# Patient Record
Sex: MALE | Race: WHITE | NOT HISPANIC OR LATINO | Employment: FULL TIME | URBAN - METROPOLITAN AREA
[De-identification: names, ages, dates, MRNs, and addresses within clinical notes are randomized per-mention and may not be internally consistent; named-entity substitution may affect disease eponyms.]

---

## 2022-01-15 ENCOUNTER — APPOINTMENT (OUTPATIENT)
Dept: GENERAL RADIOLOGY | Facility: HOSPITAL | Age: 32
End: 2022-01-15

## 2022-01-15 ENCOUNTER — ANESTHESIA (OUTPATIENT)
Dept: EMERGENCY DEPT | Facility: HOSPITAL | Age: 32
End: 2022-01-15

## 2022-01-15 ENCOUNTER — ANESTHESIA (OUTPATIENT)
Dept: PERIOP | Facility: HOSPITAL | Age: 32
End: 2022-01-15

## 2022-01-15 ENCOUNTER — HOSPITAL ENCOUNTER (INPATIENT)
Facility: HOSPITAL | Age: 32
LOS: 4 days | Discharge: HOME | End: 2022-01-19
Attending: EMERGENCY MEDICINE | Admitting: ORTHOPAEDIC SURGERY

## 2022-01-15 ENCOUNTER — ANESTHESIA EVENT (OUTPATIENT)
Dept: PERIOP | Facility: HOSPITAL | Age: 32
End: 2022-01-15

## 2022-01-15 ENCOUNTER — ANESTHESIA EVENT (OUTPATIENT)
Dept: EMERGENCY DEPT | Facility: HOSPITAL | Age: 32
End: 2022-01-15

## 2022-01-15 DIAGNOSIS — D72.829 LEUKOCYTOSIS, UNSPECIFIED TYPE: ICD-10-CM

## 2022-01-15 DIAGNOSIS — U07.1 COVID-19: ICD-10-CM

## 2022-01-15 DIAGNOSIS — T79.A22A TRAUMATIC COMPARTMENT SYNDROME OF LEFT LOWER EXTREMITY, INITIAL ENCOUNTER: Primary | ICD-10-CM

## 2022-01-15 DIAGNOSIS — R74.8 ELEVATED CK: ICD-10-CM

## 2022-01-15 LAB
ABO GROUP BLD: NORMAL
ALBUMIN SERPL-MCNC: 5.2 G/DL (ref 3.5–5.2)
ALBUMIN/GLOB SERPL: 2 G/DL
ALP SERPL-CCNC: 63 U/L (ref 39–117)
ALT SERPL W P-5'-P-CCNC: 21 U/L (ref 1–41)
ANION GAP SERPL CALCULATED.3IONS-SCNC: 10 MMOL/L (ref 5–15)
AST SERPL-CCNC: 20 U/L (ref 1–40)
BASOPHILS # BLD AUTO: 0.05 10*3/MM3 (ref 0–0.2)
BASOPHILS NFR BLD AUTO: 0.4 % (ref 0–1.5)
BILIRUB SERPL-MCNC: 0.6 MG/DL (ref 0–1.2)
BLD GP AB SCN SERPL QL: NEGATIVE
BUN SERPL-MCNC: 14 MG/DL (ref 6–20)
BUN/CREAT SERPL: 14.3 (ref 7–25)
CALCIUM SPEC-SCNC: 9.8 MG/DL (ref 8.6–10.5)
CHLORIDE SERPL-SCNC: 103 MMOL/L (ref 98–107)
CK SERPL-CCNC: 292 U/L (ref 20–200)
CO2 SERPL-SCNC: 26 MMOL/L (ref 22–29)
CREAT SERPL-MCNC: 0.98 MG/DL (ref 0.76–1.27)
D-LACTATE SERPL-SCNC: 1.7 MMOL/L (ref 0.5–2)
DEPRECATED RDW RBC AUTO: 45.3 FL (ref 37–54)
EOSINOPHIL # BLD AUTO: 0.04 10*3/MM3 (ref 0–0.4)
EOSINOPHIL NFR BLD AUTO: 0.3 % (ref 0.3–6.2)
ERYTHROCYTE [DISTWIDTH] IN BLOOD BY AUTOMATED COUNT: 14.2 % (ref 12.3–15.4)
FLUAV RNA RESP QL NAA+PROBE: NOT DETECTED
FLUAV SUBTYP SPEC NAA+PROBE: NOT DETECTED
FLUBV RNA ISLT QL NAA+PROBE: NOT DETECTED
FLUBV RNA RESP QL NAA+PROBE: NOT DETECTED
GFR SERPL CREATININE-BSD FRML MDRD: 90 ML/MIN/1.73
GLOBULIN UR ELPH-MCNC: 2.6 GM/DL
GLUCOSE SERPL-MCNC: 103 MG/DL (ref 65–99)
HCT VFR BLD AUTO: 45.6 % (ref 37.5–51)
HGB BLD-MCNC: 15 G/DL (ref 13–17.7)
IMM GRANULOCYTES # BLD AUTO: 0.05 10*3/MM3 (ref 0–0.05)
IMM GRANULOCYTES NFR BLD AUTO: 0.4 % (ref 0–0.5)
LYMPHOCYTES # BLD AUTO: 1.39 10*3/MM3 (ref 0.7–3.1)
LYMPHOCYTES NFR BLD AUTO: 10.8 % (ref 19.6–45.3)
MCH RBC QN AUTO: 28.4 PG (ref 26.6–33)
MCHC RBC AUTO-ENTMCNC: 32.9 G/DL (ref 31.5–35.7)
MCV RBC AUTO: 86.4 FL (ref 79–97)
MONOCYTES # BLD AUTO: 0.71 10*3/MM3 (ref 0.1–0.9)
MONOCYTES NFR BLD AUTO: 5.5 % (ref 5–12)
MYOGLOBIN SERPL-MCNC: 52.1 NG/ML (ref 28–72)
NEUTROPHILS NFR BLD AUTO: 10.62 10*3/MM3 (ref 1.7–7)
NEUTROPHILS NFR BLD AUTO: 82.6 % (ref 42.7–76)
NRBC BLD AUTO-RTO: 0 /100 WBC (ref 0–0.2)
PLATELET # BLD AUTO: 269 10*3/MM3 (ref 140–450)
PMV BLD AUTO: 10.7 FL (ref 6–12)
POTASSIUM SERPL-SCNC: 4.2 MMOL/L (ref 3.5–5.2)
PROT SERPL-MCNC: 7.8 G/DL (ref 6–8.5)
RBC # BLD AUTO: 5.28 10*6/MM3 (ref 4.14–5.8)
RH BLD: POSITIVE
RSV RNA NPH QL NAA+NON-PROBE: NOT DETECTED
SARS-COV-2 RNA PNL SPEC NAA+PROBE: DETECTED
SARS-COV-2 RNA RESP QL NAA+PROBE: DETECTED
SODIUM SERPL-SCNC: 139 MMOL/L (ref 136–145)
T&S EXPIRATION DATE: NORMAL
WBC NRBC COR # BLD: 12.86 10*3/MM3 (ref 3.4–10.8)

## 2022-01-15 PROCEDURE — 80053 COMPREHEN METABOLIC PANEL: CPT | Performed by: EMERGENCY MEDICINE

## 2022-01-15 PROCEDURE — 25010000002 ONDANSETRON PER 1 MG: Performed by: EMERGENCY MEDICINE

## 2022-01-15 PROCEDURE — 83874 ASSAY OF MYOGLOBIN: CPT | Performed by: INTERNAL MEDICINE

## 2022-01-15 PROCEDURE — 0KNT0ZZ RELEASE LEFT LOWER LEG MUSCLE, OPEN APPROACH: ICD-10-PCS | Performed by: ORTHOPAEDIC SURGERY

## 2022-01-15 PROCEDURE — 86901 BLOOD TYPING SEROLOGIC RH(D): CPT

## 2022-01-15 PROCEDURE — 87636 SARSCOV2 & INF A&B AMP PRB: CPT | Performed by: INTERNAL MEDICINE

## 2022-01-15 PROCEDURE — 0 CEFAZOLIN PER 500 MG: Performed by: ANESTHESIOLOGY

## 2022-01-15 PROCEDURE — 83605 ASSAY OF LACTIC ACID: CPT | Performed by: EMERGENCY MEDICINE

## 2022-01-15 PROCEDURE — 0 CEFAZOLIN IN DEXTROSE 2-4 GM/100ML-% SOLUTION: Performed by: ORTHOPAEDIC SURGERY

## 2022-01-15 PROCEDURE — 73610 X-RAY EXAM OF ANKLE: CPT

## 2022-01-15 PROCEDURE — 73590 X-RAY EXAM OF LOWER LEG: CPT

## 2022-01-15 PROCEDURE — 99284 EMERGENCY DEPT VISIT MOD MDM: CPT

## 2022-01-15 PROCEDURE — 99222 1ST HOSP IP/OBS MODERATE 55: CPT | Performed by: INTERNAL MEDICINE

## 2022-01-15 PROCEDURE — 87637 SARSCOV2&INF A&B&RSV AMP PRB: CPT | Performed by: EMERGENCY MEDICINE

## 2022-01-15 PROCEDURE — 86850 RBC ANTIBODY SCREEN: CPT | Performed by: ORTHOPAEDIC SURGERY

## 2022-01-15 PROCEDURE — 82550 ASSAY OF CK (CPK): CPT | Performed by: EMERGENCY MEDICINE

## 2022-01-15 PROCEDURE — 0 MEPERIDINE PER 100 MG: Performed by: ANESTHESIOLOGY

## 2022-01-15 PROCEDURE — 99284 EMERGENCY DEPT VISIT MOD MDM: CPT | Performed by: ORTHOPAEDIC SURGERY

## 2022-01-15 PROCEDURE — 86900 BLOOD TYPING SEROLOGIC ABO: CPT

## 2022-01-15 PROCEDURE — 25010000002 MIDAZOLAM PER 1 MG: Performed by: ANESTHESIOLOGY

## 2022-01-15 PROCEDURE — 90471 IMMUNIZATION ADMIN: CPT | Performed by: EMERGENCY MEDICINE

## 2022-01-15 PROCEDURE — 85025 COMPLETE CBC W/AUTO DIFF WBC: CPT | Performed by: EMERGENCY MEDICINE

## 2022-01-15 PROCEDURE — 25010000002 FENTANYL CITRATE (PF) 50 MCG/ML SOLUTION: Performed by: ANESTHESIOLOGY

## 2022-01-15 PROCEDURE — 25010000002 PROPOFOL 10 MG/ML EMULSION: Performed by: ANESTHESIOLOGY

## 2022-01-15 PROCEDURE — 73560 X-RAY EXAM OF KNEE 1 OR 2: CPT

## 2022-01-15 PROCEDURE — 25010000002 DEXAMETHASONE PER 1 MG: Performed by: ANESTHESIOLOGY

## 2022-01-15 PROCEDURE — 90715 TDAP VACCINE 7 YRS/> IM: CPT | Performed by: EMERGENCY MEDICINE

## 2022-01-15 PROCEDURE — 25010000002 HYDROMORPHONE PER 4 MG: Performed by: INTERNAL MEDICINE

## 2022-01-15 PROCEDURE — 27601 DECOMPRESSION OF LOWER LEG: CPT | Performed by: ORTHOPAEDIC SURGERY

## 2022-01-15 PROCEDURE — 25010000002 ENOXAPARIN PER 10 MG: Performed by: ORTHOPAEDIC SURGERY

## 2022-01-15 PROCEDURE — 87636 SARSCOV2 & INF A&B AMP PRB: CPT | Performed by: EMERGENCY MEDICINE

## 2022-01-15 PROCEDURE — 73630 X-RAY EXAM OF FOOT: CPT

## 2022-01-15 PROCEDURE — 86901 BLOOD TYPING SEROLOGIC RH(D): CPT | Performed by: ORTHOPAEDIC SURGERY

## 2022-01-15 PROCEDURE — 25010000002 TETANUS-DIPHTH-ACELL PERTUSSIS 5-2.5-18.5 LF-MCG/0.5 SUSPENSION PREFILLED SYRINGE: Performed by: EMERGENCY MEDICINE

## 2022-01-15 PROCEDURE — 86900 BLOOD TYPING SEROLOGIC ABO: CPT | Performed by: ORTHOPAEDIC SURGERY

## 2022-01-15 PROCEDURE — 25010000002 HYDROMORPHONE 1 MG/ML SOLUTION: Performed by: EMERGENCY MEDICINE

## 2022-01-15 RX ORDER — ACETAMINOPHEN 325 MG/1
650 TABLET ORAL EVERY 4 HOURS PRN
Status: DISCONTINUED | OUTPATIENT
Start: 2022-01-15 | End: 2022-01-19 | Stop reason: HOSPADM

## 2022-01-15 RX ORDER — FENTANYL CITRATE 50 UG/ML
50 INJECTION, SOLUTION INTRAMUSCULAR; INTRAVENOUS
Status: DISCONTINUED | OUTPATIENT
Start: 2022-01-15 | End: 2022-01-15 | Stop reason: HOSPADM

## 2022-01-15 RX ORDER — CEFAZOLIN SODIUM 1 G/3ML
INJECTION, POWDER, FOR SOLUTION INTRAMUSCULAR; INTRAVENOUS AS NEEDED
Status: DISCONTINUED | OUTPATIENT
Start: 2022-01-15 | End: 2022-01-15 | Stop reason: SURG

## 2022-01-15 RX ORDER — HYDROMORPHONE HYDROCHLORIDE 1 MG/ML
0.5 INJECTION, SOLUTION INTRAMUSCULAR; INTRAVENOUS; SUBCUTANEOUS
Status: DISCONTINUED | OUTPATIENT
Start: 2022-01-15 | End: 2022-01-19 | Stop reason: HOSPADM

## 2022-01-15 RX ORDER — FENTANYL CITRATE 50 UG/ML
INJECTION, SOLUTION INTRAMUSCULAR; INTRAVENOUS AS NEEDED
Status: DISCONTINUED | OUTPATIENT
Start: 2022-01-15 | End: 2022-01-15 | Stop reason: SURG

## 2022-01-15 RX ORDER — ONDANSETRON 2 MG/ML
4 INJECTION INTRAMUSCULAR; INTRAVENOUS EVERY 6 HOURS PRN
Status: DISCONTINUED | OUTPATIENT
Start: 2022-01-15 | End: 2022-01-19 | Stop reason: HOSPADM

## 2022-01-15 RX ORDER — ACETAMINOPHEN 160 MG/5ML
650 SOLUTION ORAL EVERY 4 HOURS PRN
Status: DISCONTINUED | OUTPATIENT
Start: 2022-01-15 | End: 2022-01-19 | Stop reason: HOSPADM

## 2022-01-15 RX ORDER — MAGNESIUM HYDROXIDE 1200 MG/15ML
LIQUID ORAL AS NEEDED
Status: DISCONTINUED | OUTPATIENT
Start: 2022-01-15 | End: 2022-01-15 | Stop reason: HOSPADM

## 2022-01-15 RX ORDER — PROPOFOL 10 MG/ML
VIAL (ML) INTRAVENOUS AS NEEDED
Status: DISCONTINUED | OUTPATIENT
Start: 2022-01-15 | End: 2022-01-15 | Stop reason: SURG

## 2022-01-15 RX ORDER — DEXAMETHASONE SODIUM PHOSPHATE 4 MG/ML
INJECTION, SOLUTION INTRA-ARTICULAR; INTRALESIONAL; INTRAMUSCULAR; INTRAVENOUS; SOFT TISSUE AS NEEDED
Status: DISCONTINUED | OUTPATIENT
Start: 2022-01-15 | End: 2022-01-15 | Stop reason: SURG

## 2022-01-15 RX ORDER — ACETAMINOPHEN 650 MG/1
650 SUPPOSITORY RECTAL EVERY 4 HOURS PRN
Status: DISCONTINUED | OUTPATIENT
Start: 2022-01-15 | End: 2022-01-19 | Stop reason: HOSPADM

## 2022-01-15 RX ORDER — LIDOCAINE HYDROCHLORIDE 10 MG/ML
INJECTION, SOLUTION EPIDURAL; INFILTRATION; INTRACAUDAL; PERINEURAL AS NEEDED
Status: DISCONTINUED | OUTPATIENT
Start: 2022-01-15 | End: 2022-01-15 | Stop reason: SURG

## 2022-01-15 RX ORDER — SODIUM CHLORIDE 0.9 % (FLUSH) 0.9 %
10 SYRINGE (ML) INJECTION AS NEEDED
Status: DISCONTINUED | OUTPATIENT
Start: 2022-01-15 | End: 2022-01-19 | Stop reason: HOSPADM

## 2022-01-15 RX ORDER — SODIUM CHLORIDE 0.9 % (FLUSH) 0.9 %
10 SYRINGE (ML) INJECTION EVERY 12 HOURS SCHEDULED
Status: DISCONTINUED | OUTPATIENT
Start: 2022-01-15 | End: 2022-01-19 | Stop reason: HOSPADM

## 2022-01-15 RX ORDER — NALOXONE HCL 0.4 MG/ML
0.4 VIAL (ML) INJECTION
Status: DISCONTINUED | OUTPATIENT
Start: 2022-01-15 | End: 2022-01-19 | Stop reason: HOSPADM

## 2022-01-15 RX ORDER — HYDROCODONE BITARTRATE AND ACETAMINOPHEN 5; 325 MG/1; MG/1
1 TABLET ORAL EVERY 4 HOURS PRN
Status: DISCONTINUED | OUTPATIENT
Start: 2022-01-15 | End: 2022-01-19 | Stop reason: HOSPADM

## 2022-01-15 RX ORDER — MIDAZOLAM HYDROCHLORIDE 1 MG/ML
INJECTION INTRAMUSCULAR; INTRAVENOUS AS NEEDED
Status: DISCONTINUED | OUTPATIENT
Start: 2022-01-15 | End: 2022-01-15 | Stop reason: SURG

## 2022-01-15 RX ORDER — LIDOCAINE HYDROCHLORIDE 10 MG/ML
5 INJECTION, SOLUTION EPIDURAL; INFILTRATION; INTRACAUDAL; PERINEURAL ONCE
Status: COMPLETED | OUTPATIENT
Start: 2022-01-15 | End: 2022-01-15

## 2022-01-15 RX ORDER — CEFAZOLIN SODIUM 2 G/100ML
2 INJECTION, SOLUTION INTRAVENOUS EVERY 8 HOURS
Status: COMPLETED | OUTPATIENT
Start: 2022-01-15 | End: 2022-01-16

## 2022-01-15 RX ORDER — SODIUM CHLORIDE 9 MG/ML
100 INJECTION, SOLUTION INTRAVENOUS CONTINUOUS
Status: DISCONTINUED | OUTPATIENT
Start: 2022-01-15 | End: 2022-01-17

## 2022-01-15 RX ORDER — SODIUM CHLORIDE 9 MG/ML
125 INJECTION, SOLUTION INTRAVENOUS CONTINUOUS
Status: DISCONTINUED | OUTPATIENT
Start: 2022-01-15 | End: 2022-01-15 | Stop reason: HOSPADM

## 2022-01-15 RX ORDER — HYDROMORPHONE HYDROCHLORIDE 1 MG/ML
0.5 INJECTION, SOLUTION INTRAMUSCULAR; INTRAVENOUS; SUBCUTANEOUS
Status: DISCONTINUED | OUTPATIENT
Start: 2022-01-15 | End: 2022-01-15 | Stop reason: HOSPADM

## 2022-01-15 RX ORDER — MEPERIDINE HYDROCHLORIDE 25 MG/ML
25 INJECTION INTRAMUSCULAR; INTRAVENOUS; SUBCUTANEOUS ONCE
Status: COMPLETED | OUTPATIENT
Start: 2022-01-15 | End: 2022-01-15

## 2022-01-15 RX ORDER — SODIUM CHLORIDE, SODIUM LACTATE, POTASSIUM CHLORIDE, CALCIUM CHLORIDE 600; 310; 30; 20 MG/100ML; MG/100ML; MG/100ML; MG/100ML
INJECTION, SOLUTION INTRAVENOUS CONTINUOUS PRN
Status: DISCONTINUED | OUTPATIENT
Start: 2022-01-15 | End: 2022-01-15 | Stop reason: SURG

## 2022-01-15 RX ORDER — ONDANSETRON 2 MG/ML
4 INJECTION INTRAMUSCULAR; INTRAVENOUS ONCE
Status: COMPLETED | OUTPATIENT
Start: 2022-01-15 | End: 2022-01-15

## 2022-01-15 RX ORDER — ONDANSETRON 4 MG/1
4 TABLET, FILM COATED ORAL EVERY 6 HOURS PRN
Status: DISCONTINUED | OUTPATIENT
Start: 2022-01-15 | End: 2022-01-19 | Stop reason: HOSPADM

## 2022-01-15 RX ADMIN — SODIUM CHLORIDE 100 ML/HR: 9 INJECTION, SOLUTION INTRAVENOUS at 17:30

## 2022-01-15 RX ADMIN — HYDROCODONE BITARTRATE AND ACETAMINOPHEN 1 TABLET: 5; 325 TABLET ORAL at 17:30

## 2022-01-15 RX ADMIN — SODIUM CHLORIDE, POTASSIUM CHLORIDE, SODIUM LACTATE AND CALCIUM CHLORIDE: 600; 310; 30; 20 INJECTION, SOLUTION INTRAVENOUS at 15:53

## 2022-01-15 RX ADMIN — PROPOFOL 250 MG: 10 INJECTION, EMULSION INTRAVENOUS at 15:56

## 2022-01-15 RX ADMIN — MEPERIDINE HYDROCHLORIDE 25 MG: 25 INJECTION INTRAMUSCULAR; INTRAVENOUS; SUBCUTANEOUS at 16:50

## 2022-01-15 RX ADMIN — ENOXAPARIN SODIUM 40 MG: 40 INJECTION SUBCUTANEOUS at 21:01

## 2022-01-15 RX ADMIN — SODIUM CHLORIDE, PRESERVATIVE FREE 10 ML: 5 INJECTION INTRAVENOUS at 21:01

## 2022-01-15 RX ADMIN — ONDANSETRON 4 MG: 2 INJECTION INTRAMUSCULAR; INTRAVENOUS at 12:36

## 2022-01-15 RX ADMIN — CEFAZOLIN SODIUM 2 G: 1 INJECTION, POWDER, FOR SOLUTION INTRAMUSCULAR; INTRAVENOUS at 16:03

## 2022-01-15 RX ADMIN — FENTANYL CITRATE 100 MCG: 50 INJECTION, SOLUTION INTRAMUSCULAR; INTRAVENOUS at 16:09

## 2022-01-15 RX ADMIN — HYDROMORPHONE HYDROCHLORIDE 0.5 MG: 1 INJECTION, SOLUTION INTRAMUSCULAR; INTRAVENOUS; SUBCUTANEOUS at 21:09

## 2022-01-15 RX ADMIN — CEFAZOLIN SODIUM 2 G: 2 INJECTION, SOLUTION INTRAVENOUS at 19:40

## 2022-01-15 RX ADMIN — LIDOCAINE HYDROCHLORIDE 5 ML: 10 INJECTION, SOLUTION EPIDURAL; INFILTRATION; INTRACAUDAL; PERINEURAL at 13:00

## 2022-01-15 RX ADMIN — HYDROMORPHONE HYDROCHLORIDE 1 MG: 1 INJECTION, SOLUTION INTRAMUSCULAR; INTRAVENOUS; SUBCUTANEOUS at 12:37

## 2022-01-15 RX ADMIN — PROPOFOL 50 MG: 10 INJECTION, EMULSION INTRAVENOUS at 16:03

## 2022-01-15 RX ADMIN — FENTANYL CITRATE 100 MCG: 50 INJECTION, SOLUTION INTRAMUSCULAR; INTRAVENOUS at 15:56

## 2022-01-15 RX ADMIN — DEXAMETHASONE SODIUM PHOSPHATE 4 MG: 4 INJECTION INTRA-ARTICULAR; INTRALESIONAL; INTRAMUSCULAR; INTRAVENOUS; SOFT TISSUE at 16:04

## 2022-01-15 RX ADMIN — TETANUS TOXOID, REDUCED DIPHTHERIA TOXOID AND ACELLULAR PERTUSSIS VACCINE, ADSORBED 0.5 ML: 5; 2.5; 8; 8; 2.5 SUSPENSION INTRAMUSCULAR at 12:36

## 2022-01-15 RX ADMIN — LIDOCAINE HYDROCHLORIDE 50 MG: 10 INJECTION, SOLUTION EPIDURAL; INFILTRATION; INTRACAUDAL; PERINEURAL at 15:56

## 2022-01-15 RX ADMIN — SODIUM CHLORIDE 125 ML/HR: 9 INJECTION, SOLUTION INTRAVENOUS at 12:54

## 2022-01-15 RX ADMIN — MIDAZOLAM HYDROCHLORIDE 2 MG: 1 INJECTION, SOLUTION INTRAMUSCULAR; INTRAVENOUS at 16:03

## 2022-01-15 NOTE — CONSULTS
"Orthopaedic Consult Note    Patient Care Team:  Provider, No Known as PCP - General    Chief complaint  Left leg pain    Subjective .     History of present illness: 31-year-old male who had a crush injury to his left lower leg today.  It happened about 730.  He had his leg stuck between 2 forklifts.  No previous injury.  He has been vaccinated against COVID with no symptoms.  He tested COVID-positive in the emergency department on screening.  The emergency room physician did a compartment measurement and measured 27 mm and 20 in the superficial and posterior compartments.  Lateral and anterior compartments were less than 5.  Review of Systems  Pertinent items are noted in HPI all other systems are reviewed and are negative    History  History reviewed. No pertinent family history.  Social History     Socioeconomic History   • Marital status: Single   Tobacco Use   • Smoking status: Never Smoker   Substance and Sexual Activity   • Alcohol use: Never   • Drug use: Never     Past Surgical History:   Procedure Laterality Date   • TONSILLECTOMY       History reviewed. No pertinent past medical history.  No Known Allergies    Current Facility-Administered Medications:   •  sodium chloride 0.9 % infusion, 125 mL/hr, Intravenous, Continuous, Ortiz Wesley MD, Last Rate: 125 mL/hr at 01/15/22 1254, 125 mL/hr at 01/15/22 1254  No current outpatient medications on file.    Objective     Vital Signs   Temp:  [99.1 °F (37.3 °C)] 99.1 °F (37.3 °C)  Heart Rate:  [98] 98  Resp:  [16] 16  BP: (118-155)/(67-89) 118/67      Physical Exam:     GENERAL APPEARANCE: awake, alert & oriented x 3, in no acute distress and well developed, well nourished  Vitals:    01/15/22 1109 01/15/22 1228 01/15/22 1359   BP: 155/89 130/78 118/67   BP Location: Left arm     Patient Position: Sitting     Pulse: 98     Resp: 16     Temp: 99.1 °F (37.3 °C)     TempSrc: Oral     SpO2: 99% 99% 95%   Weight: 113 kg (250 lb)     Height: 190.5 cm (75\")   "     PSYCH: normal affect  HEAD: Normocephalic, without obvious abnormality, atraumatic  EYES: No icterus, corneas clear, PERRLA  CARDIOVASCULAR: pulses palpable and equal bilaterally. Capillary refill less than 2 seconds  LUNGS:  breathing nonlabored  ABDOMEN: Soft, nontender, nondistended  BACK: No C-T- L spine tenderness  EXTREMITIES: no clubbing, cyanosis  NEURO: Motor and sensory intact extremities  MUSCULOSKELETAL: He has significant calf swelling in the left lower leg.  Skin is not severely tight but is certainly swollen.  Sensation grossly intact.  Foot motor sensor intact.  Capillary refill less than 3 seconds.  He does have a small area of bleeding where he was stuck for his compartment pressures.    Labs:  Lab Results (last 24 hours)     Procedure Component Value Units Date/Time    COVID-19, FLU A/B, RSV PCR - Swab, Nasopharynx [[id number]]  (Abnormal) Collected: 01/15/22 1249    Specimen: Swab from Nasopharynx Updated: 01/15/22 1400     COVID19 Detected     Influenza A PCR Not Detected     Influenza B PCR Not Detected     RSV, PCR Not Detected    Narrative:      Fact sheet for providers: https://www.fda.gov/media/294824/download    Fact sheet for patients: https://www.fda.gov/media/852797/download    Test performed by PCR.    Comprehensive Metabolic Panel [[id number]]  (Abnormal) Collected: 01/15/22 1245    Specimen: Blood Updated: 01/15/22 1310     Glucose 103 mg/dL      BUN 14 mg/dL      Creatinine 0.98 mg/dL      Sodium 139 mmol/L      Potassium 4.2 mmol/L      Comment: Slight hemolysis detected by analyzer. Results may be affected.        Chloride 103 mmol/L      CO2 26.0 mmol/L      Calcium 9.8 mg/dL      Total Protein 7.8 g/dL      Albumin 5.20 g/dL      ALT (SGPT) 21 U/L      AST (SGOT) 20 U/L      Alkaline Phosphatase 63 U/L      Total Bilirubin 0.6 mg/dL      eGFR Non African Amer 90 mL/min/1.73      Globulin 2.6 gm/dL      Comment: Calculated Result        A/G Ratio 2.0 g/dL      BUN/Creatinine  Ratio 14.3     Anion Gap 10.0 mmol/L     Narrative:      GFR Normal >60  Chronic Kidney Disease <60  Kidney Failure <15      CK [[id number]]  (Abnormal) Collected: 01/15/22 1245    Specimen: Blood Updated: 01/15/22 1310     Creatine Kinase 292 U/L     Lactic Acid, Plasma [[id number]]  (Normal) Collected: 01/15/22 1245    Specimen: Blood Updated: 01/15/22 1306     Lactate 1.7 mmol/L      Comment: Falsely depressed results may occur on samples drawn from patients receiving N-Acetylcysteine (NAC) or Metamizole.       CBC & Differential [[id number]]  (Abnormal) Collected: 01/15/22 1245    Specimen: Blood Updated: 01/15/22 1253    Narrative:      The following orders were created for panel order CBC & Differential.  Procedure                               Abnormality         Status                     ---------                               -----------         ------                     CBC Auto Differential[[id number]]        Abnormal            Final result                 Please view results for these tests on the individual orders.    CBC Auto Differential [[id number]]  (Abnormal) Collected: 01/15/22 1245    Specimen: Blood Updated: 01/15/22 1253     WBC 12.86 10*3/mm3      RBC 5.28 10*6/mm3      Hemoglobin 15.0 g/dL      Hematocrit 45.6 %      MCV 86.4 fL      MCH 28.4 pg      MCHC 32.9 g/dL      RDW 14.2 %      RDW-SD 45.3 fl      MPV 10.7 fL      Platelets 269 10*3/mm3      Neutrophil % 82.6 %      Lymphocyte % 10.8 %      Monocyte % 5.5 %      Eosinophil % 0.3 %      Basophil % 0.4 %      Immature Grans % 0.4 %      Neutrophils, Absolute 10.62 10*3/mm3      Lymphocytes, Absolute 1.39 10*3/mm3      Monocytes, Absolute 0.71 10*3/mm3      Eosinophils, Absolute 0.04 10*3/mm3      Basophils, Absolute 0.05 10*3/mm3      Immature Grans, Absolute 0.05 10*3/mm3      nRBC 0.0 /100 WBC           Imaging:  I viewed and personally interpreted radiographs of his left tib-fib as negative        Assessment/Plan   Left lower extremity  compartment syndrome  The patient is COVID-positive and will be on appropriate precautions    The patient has signs and symptoms of compartment syndrome.  His compartment measurements are borderline and his swelling is significant.  I discussed the risk benefits alternatives surgery and because of the significance of a missed compartment syndrome it would be better to do a fasciotomy to release his compartment.  The risk of surgery are significant less than the risk of a compartment syndrome with permanent neurologic and muscular dysfunction and possible need for future amputation.  All of his questions were answered.  He consents to surgery.  I discussed the patients findings and my recommendations with patient, family and consulting provider    Chance Zhu MD  01/15/22  14:19 EST

## 2022-01-15 NOTE — ANESTHESIA PREPROCEDURE EVALUATION
Anesthesia Evaluation     Patient summary reviewed and Nursing notes reviewed   no history of anesthetic complications:  NPO Solid Status: > 8 hours  NPO Liquid Status: > 2 hours           Airway   Mallampati: II  TM distance: >3 FB  Neck ROM: full  No difficulty expected  Dental - normal exam     Pulmonary - normal exam     ROS comment: COVID +, asymptomatic  Cardiovascular - negative cardio ROS and normal exam        Neuro/Psych- negative ROS  GI/Hepatic/Renal/Endo - negative ROS     Musculoskeletal         ROS comment: LLE crush injury with compartment syndrome.  Abdominal    Substance History      OB/GYN          Other - negative ROS                       Anesthesia Plan    ASA 1 - emergent     general     intravenous induction     Anesthetic plan, all risks, benefits, and alternatives have been provided, discussed and informed consent has been obtained with: patient.

## 2022-01-15 NOTE — ANESTHESIA POSTPROCEDURE EVALUATION
Patient: Jagdish Kennedy    Procedure Summary     Date: 01/15/22 [facility] / [facility]    Anesthesia Start: 1553 Anesthesia Stop: 1629    Procedure: TIBIA FASCIOTOMY (Left Ankle) Diagnosis:     Surgeons: Chance Zhu MD Provider: Marvin Moore Jr., MD    Anesthesia Type: general ASA Status: 1 - Emergent          Anesthesia Type: general    Vitals  No vitals data found for the desired time range.          Post Anesthesia Care and Evaluation    Patient location during evaluation: PACU (IN OR)  Patient participation: complete - patient participated  Level of consciousness: awake and alert  Pain management: adequate  Airway patency: patent  Anesthetic complications: No anesthetic complications  PONV Status: none  Cardiovascular status: hemodynamically stable and acceptable  Respiratory status: nonlabored ventilation, acceptable and nasal cannula  Hydration status: acceptable

## 2022-01-15 NOTE — ED PROVIDER NOTES
Subjective   This is a pleasant 31-year-old male who is a .  He is accompanied by his significant other.  He is generally healthy takes no regular medications and has been vaccinated against COVID.    He presents to the emergency room today for evaluation of a crush injury to his left leg that happened about an hour ago when he was at work got trapped between the rear end of 2 forklifts.  He yelled out in pain fork was pulled apart and he was able to ambulate and had no other injury except for his left leg but it is progressively more painful but he can still bear weight with difficulty on it.  He has never had any injury to this leg in the past.  He believes his last tetanus shot was more than 10 years ago.  He describes a throbbing pain in the left leg.    He has no chest pain or shortness of breath.  Bowel movements and urine have been normal.  He has no head pain or neck pain or back pain.  He has not had a recent illness.        All other systems are reviewed and are negative except as noted above.          Review of Systems   All other systems reviewed and are negative.      History reviewed. No pertinent past medical history.    No Known Allergies    Past Surgical History:   Procedure Laterality Date   • TONSILLECTOMY         History reviewed. No pertinent family history.    Social History     Socioeconomic History   • Marital status: Single   Tobacco Use   • Smoking status: Never Smoker   Substance and Sexual Activity   • Alcohol use: Never   • Drug use: Never           Objective   Physical Exam  Vitals and nursing note reviewed. Exam conducted with a chaperone present.   Constitutional:       Appearance: He is normal weight.      Comments: This is a pleasant 31-year-old who is alert and oriented GCS 15.   HENT:      Head: Normocephalic and atraumatic.      Right Ear: External ear normal.      Left Ear: External ear normal.      Nose: Nose normal.      Mouth/Throat:      Mouth: Mucous membranes  are moist.      Pharynx: Oropharynx is clear.   Eyes:      Extraocular Movements: Extraocular movements intact.      Conjunctiva/sclera: Conjunctivae normal.      Pupils: Pupils are equal, round, and reactive to light.   Cardiovascular:      Rate and Rhythm: Normal rate and regular rhythm.      Pulses: Normal pulses.      Heart sounds: Normal heart sounds.   Pulmonary:      Effort: Pulmonary effort is normal.      Breath sounds: Normal breath sounds.   Abdominal:      General: Abdomen is flat. Bowel sounds are normal.      Palpations: Abdomen is soft.      Comments: Lumbosacral spine, thoracic spine, and pelvis are stable and nontender.   Musculoskeletal:      Cervical back: Normal range of motion and neck supple.      Comments: Both upper extremities good range of motion no point tenderness neurovascularly intact.  Right lower extremity no point tenderness neurovascularly intact.  Left lower extremity left hip and left proximal thigh are nontender.  He has swelling and ecchymosis starting from his distal thigh going into his left calf where he has circumferential swelling of the calf.  It seems to be taut and woody Tomei touch.  He has some skin abrasions on the medial aspect of his leg but there is no evidence of a laceration.  His left foot is cool to touch compared with his right but he has 2/4 pulses in the left foot.  He has sensation to touch still intact in his toes and left side.  His capillary refill is about 3 seconds in the toes on the left side.   Skin:     Capillary Refill: Capillary refill takes less than 2 seconds.   Neurological:      Mental Status: He is alert.      Comments: Face is symmetric voice strong tongue is midline.  Vision, hearing, and speech are preserved.  Both upper extremities and right lower extremity unremarkable please see musculoskeletal for left leg.         Critical Care  Performed by: Ortiz Wesley MD  Authorized by: Ortiz Wesley MD     Critical care provider  statement:     Critical care time (minutes):  40    Critical care was necessary to treat or prevent imminent or life-threatening deterioration of the following conditions:  Trauma    Critical care was time spent personally by me on the following activities:  Development of treatment plan with patient or surrogate, discussions with consultants, evaluation of patient's response to treatment, obtaining history from patient or surrogate, review of old charts, re-evaluation of patient's condition, pulse oximetry, ordering and review of radiographic studies, ordering and review of laboratory studies and ordering and performing treatments and interventions         Procedure is compartment pressure measurement in the left leg.    I obtained oral consent from the patient talked about the risk and benefit including the identification of compartment syndrome and he agreed to proceed.  He was given some pain medicine prior to the procedure.    Using a translinear ultrasound probe I looked at his compartments to make sure that the area is selective and there were no vascular structures and thankfully there were not.  He did have cobblestoning on ultrasound consistent with tissue edema.    His deep posterior, lateral, and superficial posterior compartments were marked.  The leg was then prepped and draped in normal sterile fashion.  1 mL of lidocaine was injected with a 27-gauge needle and the skin over each of the sampling sites.    Using standard technique the [employer] pressure monitor that had been calibrated and was sterile I obtained the following pressures.  In the deep posterior compartment the pressure was 27.  The superficial posterior compartment the pressure was 22.  In the lateral compartment the pressure was 5.    The patient tolerated the procedure well estimated blood loss was less than 1 mL and there were no immediate complications.  ED Course                Recent Results (from the past 24 hour(s))   Comprehensive  Metabolic Panel    Collection Time: 01/15/22 12:45 PM    Specimen: Blood   Result Value Ref Range    Glucose 103 (H) 65 - 99 mg/dL    BUN 14 6 - 20 mg/dL    Creatinine 0.98 0.76 - 1.27 mg/dL    Sodium 139 136 - 145 mmol/L    Potassium 4.2 3.5 - 5.2 mmol/L    Chloride 103 98 - 107 mmol/L    CO2 26.0 22.0 - 29.0 mmol/L    Calcium 9.8 8.6 - 10.5 mg/dL    Total Protein 7.8 6.0 - 8.5 g/dL    Albumin 5.20 3.50 - 5.20 g/dL    ALT (SGPT) 21 1 - 41 U/L    AST (SGOT) 20 1 - 40 U/L    Alkaline Phosphatase 63 39 - 117 U/L    Total Bilirubin 0.6 0.0 - 1.2 mg/dL    eGFR Non African Amer 90 >60 mL/min/1.73    Globulin 2.6 gm/dL    A/G Ratio 2.0 g/dL    BUN/Creatinine Ratio 14.3 7.0 - 25.0    Anion Gap 10.0 5.0 - 15.0 mmol/L   Lactic Acid, Plasma    Collection Time: 01/15/22 12:45 PM    Specimen: Blood   Result Value Ref Range    Lactate 1.7 0.5 - 2.0 mmol/L   CK    Collection Time: 01/15/22 12:45 PM    Specimen: Blood   Result Value Ref Range    Creatine Kinase 292 (H) 20 - 200 U/L   CBC Auto Differential    Collection Time: 01/15/22 12:45 PM    Specimen: Blood   Result Value Ref Range    WBC 12.86 (H) 3.40 - 10.80 10*3/mm3    RBC 5.28 4.14 - 5.80 10*6/mm3    Hemoglobin 15.0 13.0 - 17.7 g/dL    Hematocrit 45.6 37.5 - 51.0 %    MCV 86.4 79.0 - 97.0 fL    MCH 28.4 26.6 - 33.0 pg    MCHC 32.9 31.5 - 35.7 g/dL    RDW 14.2 12.3 - 15.4 %    RDW-SD 45.3 37.0 - 54.0 fl    MPV 10.7 6.0 - 12.0 fL    Platelets 269 140 - 450 10*3/mm3    Neutrophil % 82.6 (H) 42.7 - 76.0 %    Lymphocyte % 10.8 (L) 19.6 - 45.3 %    Monocyte % 5.5 5.0 - 12.0 %    Eosinophil % 0.3 0.3 - 6.2 %    Basophil % 0.4 0.0 - 1.5 %    Immature Grans % 0.4 0.0 - 0.5 %    Neutrophils, Absolute 10.62 (H) 1.70 - 7.00 10*3/mm3    Lymphocytes, Absolute 1.39 0.70 - 3.10 10*3/mm3    Monocytes, Absolute 0.71 0.10 - 0.90 10*3/mm3    Eosinophils, Absolute 0.04 0.00 - 0.40 10*3/mm3    Basophils, Absolute 0.05 0.00 - 0.20 10*3/mm3    Immature Grans, Absolute 0.05 0.00 - 0.05 10*3/mm3     nRBC 0.0 0.0 - 0.2 /100 WBC   COVID-19, FLU A/B, RSV PCR - Swab, Nasopharynx    Collection Time: 01/15/22 12:49 PM    Specimen: Nasopharynx; Swab   Result Value Ref Range    COVID19 Detected (C) Not Detected - Ref. Range    Influenza A PCR Not Detected Not Detected    Influenza B PCR Not Detected Not Detected    RSV, PCR Not Detected Not Detected     Note: In addition to lab results from this visit, the labs listed above may include labs taken at another facility or during a different encounter within the last 24 hours. Please correlate lab times with ED admission and discharge times for further clarification of the services performed during this visit.    XR Tibia Fibula 2 View Left   Final Result   Fairly extensive superficial soft tissue edema is noted   along the medial aspect of the left knee and proximal tibia, with small   suprapatellar joint effusion also noted. The remainder of the imaged   left lower extremity otherwise demonstrates no evidence of acute osseous   or articular abnormality.       This report was finalized on 1/15/2022 12:46 PM by Yordan Melendez.          XR Knee 1 or 2 View Left   Final Result   Fairly extensive superficial soft tissue edema is noted   along the medial aspect of the left knee and proximal tibia, with small   suprapatellar joint effusion also noted. The remainder of the imaged   left lower extremity otherwise demonstrates no evidence of acute osseous   or articular abnormality.       This report was finalized on 1/15/2022 12:46 PM by Yordan Melendez.          XR Foot 3+ View Left   Final Result   Fairly extensive superficial soft tissue edema is noted   along the medial aspect of the left knee and proximal tibia, with small   suprapatellar joint effusion also noted. The remainder of the imaged   left lower extremity otherwise demonstrates no evidence of acute osseous   or articular abnormality.       This report was finalized on 1/15/2022 12:46 PM by Yordan Melendez.  "         XR Ankle 3+ View Left   Final Result   Fairly extensive superficial soft tissue edema is noted   along the medial aspect of the left knee and proximal tibia, with small   suprapatellar joint effusion also noted. The remainder of the imaged   left lower extremity otherwise demonstrates no evidence of acute osseous   or articular abnormality.       This report was finalized on 1/15/2022 12:46 PM by Yordan Melendez.            Vitals:    01/15/22 1109 01/15/22 1228 01/15/22 1359   BP: 155/89 130/78 118/67   BP Location: Left arm     Patient Position: Sitting     Pulse: 98     Resp: 16     Temp: 99.1 °F (37.3 °C)     TempSrc: Oral     SpO2: 99% 99% 95%   Weight: 113 kg (250 lb)     Height: 190.5 cm (75\")       Medications   sodium chloride 0.9 % infusion (125 mL/hr Intravenous New Bag 1/15/22 1254)   Tetanus-Diphth-Acell Pertussis (BOOSTRIX) injection 0.5 mL (0.5 mL Intramuscular Given 1/15/22 1236)   HYDROmorphone (DILAUDID) injection 1 mg (1 mg Intramuscular Given 1/15/22 1237)   ondansetron (ZOFRAN) injection 4 mg (4 mg Intravenous Given 1/15/22 1236)   lidocaine PF 1% (XYLOCAINE) injection 5 mL (5 mL Infiltration Given by Other 1/15/22 1300)     ECG/EMG Results (last 24 hours)     ** No results found for the last 24 hours. **        No orders to display                                      MDM  Number of Diagnoses or Management Options  COVID-19  Elevated CK  Leukocytosis, unspecified type  Traumatic compartment syndrome of left lower extremity, initial encounter ([facility])  Diagnosis management comments:       I have reexamined the patient several times.  His left foot remains a bit cool with 2/4 pulses and occasional paresthesias though not persistent.  He still has motor movement in his toes.    His leg remains quite swollen.  I spoke to Dr. Zhu, on-call orthopedics about the patient.  I thought he had borderline findings for compartment syndrome and needed either careful monitoring or be taken to the OR. "  He asked if I contact  to see if they would be able to take the patient in transfer.  I spoke to Dr. Mccallum, the trauma service/ and unfortunately  is completely full and they could not take the patient.    I spoke again Dr. Zhu and he is come down and seen the patient and agrees the patient needs to go to the OR for relief of his compartment syndrome before he starts to have full loss of any function.    The patient also is noted to be positive for COVID though he is fully vaccinated and asymptomatic.    He has a mild leukocytosis and his CK is a bit elevated.  He is receiving IV fluids.  The patient was made up-to-date on his tetanus shot think his leukocytosis is likely reactive.    I spoke Dr. Moses, on-call hospital medicine and her and her colleagues will admit the patient after the patient returned from the OR.    All are agreeable with the plan       Amount and/or Complexity of Data Reviewed  Clinical lab tests: reviewed  Tests in the radiology section of CPT®: reviewed        Final diagnoses:   Traumatic compartment syndrome of left lower extremity, initial encounter ([facility])   COVID-19   Leukocytosis, unspecified type   Elevated CK       ED Disposition  ED Disposition     ED Disposition Condition Comment    Decision to Admit  Level of Care: Telemetry [5]   Diagnosis: Traumatic compartment syndrome of left lower extremity, initial encounter ([facility]) [[id number]]   Admitting Physician: NICOLE RICHEY [[id number]]   Attending Physician: NICOLE RICHEY [[id number]]   Certification: I Certify That Inpatient Hospital Services Are Medically Necessary For Greater Than 2 Midnights            No follow-up provider specified.       Medication List      No changes were made to your prescriptions during this visit.          Ortiz Wesley MD  01/15/22 5653

## 2022-01-15 NOTE — OP NOTE
OPERATIVE REPORT     DATE OF PROCEDURE: 1/15/2022     SURGEON: Chance Zhu M.D.     STAFF: Circulator: Haase, Sherri L, RN  Scrub Person: Elaina Erickson     PREOPERATIVE DIAGNOSIS: Left lower leg compartment syndrome  POSTOPERATIVE DIAGNOSIS: Left lower leg compartment syndrome of the superficial and deep posterior compartments       Diagnosis is left lower extremity compartment syndrome    Procedure(s):  TIBIA FASCIOTOMY of the left lower extremity    Surgeon(s):  Chance Zhu MD     Circulator: Haase, Sherri L, RN  Scrub Person: Elaina Erickson          SURGICAL DETAILS:     APPROACH: Open    ANESTHESIA: General    PREOPERATIVE ANTIBIOTICS: Ancef    ESTIMATED BLOOD LOSS: 200ml     TOURNIQUET TIME: None     SPECIMENS: * No orders in the log *     IMPLANTS: Nothing was implanted during the procedure      DRAINS: * No LDAs found *    LOCAL INJECTION: None    MODIFIER(S): None    COMPLICATIONS: None       INDICATIONS FOR PROCEDURE: Patient was seen in emergency department with impending compartment syndrome of the posterior superficial and deep compartments.  The risks, benefits, alternatives, and potential complications of the surgery were discussed with the patient in detail to include but not limited to infection, bleeding, anesthesia risks, nerve injury, vessel injury, pain, blood clots, possible need for blood transfusion, possible need for further procedures, myocardial infarction, stroke, and death. Specific risks for this surgery also include permanent nerve and muscle damage. Specific details of the procedure, hospitalization, recovery, rehabilitation, and long-term precautions were also provided. Pre-operative teaching was provided. Surgical device selection was outlined, and the many options available were explained; final choices will be made at the time of the procedure to match the anatomy and condition of the bone, and soft tissue structures. Understanding of all topics was conveyed to me by  the patient, and consent was given to proceed with left lower extremity emergent fasciotomy.     INTRAOPERATIVE FINDINGS: Greater than 200 cc hematoma of the superficial and deep posterior compartments.  Soft anterior and lateral compartments    PROCEDURE: The patient was identified in the preoperative holding area. The operative site was confirmed and marked. A sequential compression device was placed on the nonoperative leg. The risks, benefits, and alternatives to surgery were again confirmed with the patient and the patient wished to proceed. The patient was brought to the operating room and placed on the operating room table in the supine position. A huddle was performed with the patient and all vital surgical team members to confirm the correct operative site, procedure, anesthesia type, and operative plan with the patient. After anesthesia was performed, the patient was positioned in the supine position. All bony prominences were padded.  Intravenous antibiotic prophylaxis was given and confirmed with the anesthesia team. The operative extremity was prepped and draped in the usual sterile fashion. A surgical time out was performed immediately preceding the incision with all personnel in the operating room to confirm patient identity, the correct operative site and extremity, correct radiographic studies, availability of appropriate surgical equipment and agreement on the planned procedure.     An incision was made 2 cm off the medial tibial border.  Greater than 2 mm of hematoma was expressed.  Blunt dissection was performed down to the bone the periosteum and deep compartment was removed off the tibia.  There is some hematoma there also expressed complete fasciotomy was performed of the superficial and deep compartments.  The anterior lateral compartments were extremely soft and no incision was made laterally.  The muscle did herniate once released and therefore the wound was left open with a wound VAC  dressing for stability and decompression.    No apparent complications occurred during the procedure Instrument, sponge and needle counts were correct x 2.     POST OPERATIVE PLAN:   He is to elevate the left lower extremity.  He may weight-bear as tolerated but keep ambulation to minimal  Anticipate return to the OR Monday or Tuesday for irrigation debridement and wound closure of the fasciotomy.  24 h IV antibiotics.

## 2022-01-15 NOTE — H&P
[facility] Medicine Services  HISTORY AND PHYSICAL    Patient Name: Jagdish Kennedy  : 1990  MRN: [id number]  Primary Care Physician: Shakila, No Known  Date of admission: 1/15/2022      Subjective   Subjective     Chief Complaint:  Leg injury    HPI:  Jagdish Kennedy is a 31 y.o. male without chronic illness who presents for evaluation of left leg injury.  He was at work and his left leg was pinned by a forklift for approximately 7-10 seconds.  He was able to bear weight and continue to work, with his pain waxing and waning.  However, throughout the day the swelling has continued to increase to the extent that he was concerned and presented for evaluation.  He denies numbness or tingling, loss of motor strength, or substantial pain at this moment.  His last meal was approximately 7 AM today.    In the ED he is positive for COVID-19, he is fully vaccinated and denies headache, sinus congestion, sore throat, fever, chills, cough, shortness of breath or any other symptoms.    COVID Details:    Symptoms:    [x] NONE [] Fever []  Cough [] Shortness of breath [] Change in taste/smell      Review of Systems   Gen- No fevers, chills  CV- No chest pain, palpitations  Resp- No cough, dyspnea  GI- No N/V/D, abd pain  All other systems reviewed and are negative.     Personal History     History reviewed. No pertinent past medical history.    Past Surgical History:   Procedure Laterality Date   • TONSILLECTOMY         Family History: Family history reviewed with the patient and is noncontributory to his current presentation    Social History:  reports that he has never smoked. He does not have any smokeless tobacco history on file. He reports that he does not drink alcohol and does not use drugs.  Social History     Social History Narrative   • Not on file       Medications:  Available home medication information reviewed.  (Not in a hospital admission)      No Known  Allergies    Objective   Objective     Vital Signs:   Temp:  [99.1 °F (37.3 °C)] 99.1 °F (37.3 °C)  Heart Rate:  [98] 98  Resp:  [16] 16  BP: (118-155)/(67-89) 118/67       Physical Exam   Constitutional: Awake, alert, no acute distress, laying on ED stretcher  Eyes: PERRL, sclerae anicteric, no conjunctival injection  HENT: NCAT, mucous membranes moist  Neck: Supple, trachea midline  Respiratory: Clear to auscultation bilaterally, nonlabored respirations   Cardiovascular: RRR, no murmurs, rubs, or gallops, palpable radial pulses bilaterally; left foot slightly cool, difficulty palpating pulses  Gastrointestinal: Positive bowel sounds, soft, nontender, nondistended  Musculoskeletal: Left calf significantly enlarged, firm, tender to palpation  Psychiatric: Appropriate affect, cooperative  Neurologic: Oriented x 3, strength symmetric in all extremities, speech clear, sensation intact in bilateral legs and feet  Skin: No rashes    Result Review:  I have personally reviewed the results from the time of this admission to 1/15/2022 15:16 EST and agree with these findings:  [x]  Laboratory  []  Microbiology  []  Radiology  []  EKG/Telemetry   []  Cardiology/Vascular   []  Pathology  []  Old records  []  Other:  Most notable findings include: Positive COVID-19      LAB RESULTS:      Lab 01/15/22  1245   WBC 12.86*   HEMOGLOBIN 15.0   HEMATOCRIT 45.6   PLATELETS 269   NEUTROS ABS 10.62*   IMMATURE GRANS (ABS) 0.05   LYMPHS ABS 1.39   MONOS ABS 0.71   EOS ABS 0.04   MCV 86.4   LACTATE 1.7         Lab 01/15/22  1245   SODIUM 139   POTASSIUM 4.2   CHLORIDE 103   CO2 26.0   ANION GAP 10.0   BUN 14   CREATININE 0.98   GLUCOSE 103*   CALCIUM 9.8         Lab 01/15/22  1245   TOTAL PROTEIN 7.8   ALBUMIN 5.20   GLOBULIN 2.6   ALT (SGPT) 21   AST (SGOT) 20   BILIRUBIN 0.6   ALK PHOS 63                         Microbiology Results (last 10 days)     Procedure Component Value - Date/Time    COVID-19, FLU A/B, RSV PCR - Swab, Nasopharynx  [[id number]]  (Abnormal) Collected: 01/15/22 1249    Lab Status: Final result Specimen: Swab from Nasopharynx Updated: 01/15/22 1400     COVID19 Detected     Influenza A PCR Not Detected     Influenza B PCR Not Detected     RSV, PCR Not Detected    Narrative:      Fact sheet for providers: https://www.fda.gov/media/812279/download    Fact sheet for patients: https://www.fda.gov/media/164832/download    Test performed by PCR.          XR Knee 1 or 2 View Left    Result Date: 1/15/2022  EXAMINATION: XR TIBIA FIBULA 2 VW LEFT-, XR FOOT 3+ VW LEFT-, XR KNEE 1 OR 2 VW LEFT-, XR ANKLE 3+ VW LEFT-  INDICATION: injury  COMPARISON: NONE  FINDINGS: There is extensive subcutaneous edema and soft tissue reticulation noted along the medial aspect of the proximal tibia/knee. Cortical margins are otherwise intact, without evidence of acute fracture. Small suprapatellar joint effusion is present.  The ankle mortise is symmetric with an intact talar dome. No fracture or dislocation. Cortical margins of the left foot are maintained without evidence of fracture, dislocation or suspicious osseous lesion. There is no evidence of subluxation or dislocation.      Impression: Fairly extensive superficial soft tissue edema is noted along the medial aspect of the left knee and proximal tibia, with small suprapatellar joint effusion also noted. The remainder of the imaged left lower extremity otherwise demonstrates no evidence of acute osseous or articular abnormality.  This report was finalized on 1/15/2022 12:46 PM by Yordan Melendez.      XR Tibia Fibula 2 View Left    Result Date: 1/15/2022  EXAMINATION: XR TIBIA FIBULA 2 VW LEFT-, XR FOOT 3+ VW LEFT-, XR KNEE 1 OR 2 VW LEFT-, XR ANKLE 3+ VW LEFT-  INDICATION: injury  COMPARISON: NONE  FINDINGS: There is extensive subcutaneous edema and soft tissue reticulation noted along the medial aspect of the proximal tibia/knee. Cortical margins are otherwise intact, without evidence of acute  fracture. Small suprapatellar joint effusion is present.  The ankle mortise is symmetric with an intact talar dome. No fracture or dislocation. Cortical margins of the left foot are maintained without evidence of fracture, dislocation or suspicious osseous lesion. There is no evidence of subluxation or dislocation.      Impression: Fairly extensive superficial soft tissue edema is noted along the medial aspect of the left knee and proximal tibia, with small suprapatellar joint effusion also noted. The remainder of the imaged left lower extremity otherwise demonstrates no evidence of acute osseous or articular abnormality.  This report was finalized on 1/15/2022 12:46 PM by Yordan Melendez.      XR Ankle 3+ View Left    Result Date: 1/15/2022  EXAMINATION: XR TIBIA FIBULA 2 VW LEFT-, XR FOOT 3+ VW LEFT-, XR KNEE 1 OR 2 VW LEFT-, XR ANKLE 3+ VW LEFT-  INDICATION: injury  COMPARISON: NONE  FINDINGS: There is extensive subcutaneous edema and soft tissue reticulation noted along the medial aspect of the proximal tibia/knee. Cortical margins are otherwise intact, without evidence of acute fracture. Small suprapatellar joint effusion is present.  The ankle mortise is symmetric with an intact talar dome. No fracture or dislocation. Cortical margins of the left foot are maintained without evidence of fracture, dislocation or suspicious osseous lesion. There is no evidence of subluxation or dislocation.      Impression: Fairly extensive superficial soft tissue edema is noted along the medial aspect of the left knee and proximal tibia, with small suprapatellar joint effusion also noted. The remainder of the imaged left lower extremity otherwise demonstrates no evidence of acute osseous or articular abnormality.  This report was finalized on 1/15/2022 12:46 PM by Yordan Melendez.      XR Foot 3+ View Left    Result Date: 1/15/2022  EXAMINATION: XR TIBIA FIBULA 2 VW LEFT-, XR FOOT 3+ VW LEFT-, XR KNEE 1 OR 2 VW LEFT-, XR ANKLE  3+ VW LEFT-  INDICATION: injury  COMPARISON: NONE  FINDINGS: There is extensive subcutaneous edema and soft tissue reticulation noted along the medial aspect of the proximal tibia/knee. Cortical margins are otherwise intact, without evidence of acute fracture. Small suprapatellar joint effusion is present.  The ankle mortise is symmetric with an intact talar dome. No fracture or dislocation. Cortical margins of the left foot are maintained without evidence of fracture, dislocation or suspicious osseous lesion. There is no evidence of subluxation or dislocation.      Impression: Fairly extensive superficial soft tissue edema is noted along the medial aspect of the left knee and proximal tibia, with small suprapatellar joint effusion also noted. The remainder of the imaged left lower extremity otherwise demonstrates no evidence of acute osseous or articular abnormality.  This report was finalized on 1/15/2022 12:46 PM by Yordan Melendez.            Assessment/Plan   Assessment & Plan     Active Hospital Problems    Diagnosis  POA   • **Traumatic compartment syndrome of left lower extremity (HCC) [T79.A22A]  Yes   • Lab test positive for detection of COVID-19 virus [U07.1]  Yes     Summary: This is a 31-year-old healthy male fully vaccinated for COVID-19 who presents after traumatic injury to his left leg with subsequent pain and increasing swelling being admitted for concern of compartment syndrome; he was incidentally and asymptomatically COVID-19 positive in the ED    Assessment/plan    Traumatic injury to the left leg  Compartment syndrome of the left calf/leg  -Last meal 7 AM today; remain n.p.o.  -ED has discussed with Dr. Zhu, patient is to be made the next case  - Oral and IV pain medicines ordered  - Holding DVT PPx pending surgery (cannot put compression device on left leg, avoiding anticoagulants for impending surgery) -will need prophylaxis postop once cleared by orthopedics    Asymptomatic COVID-19  positive lab test  -Fully vaccinated for the same  - Starting false positive protocol to make determination on precautions, repeat swab ordered    DVT prophylaxis: Pending surgery and orthopedic recommendations      CODE STATUS:    Code Status and Medical Interventions:   Ordered at: 01/15/22 1516     Code Status (Patient has no pulse and is not breathing):    CPR (Attempt to Resuscitate)     Medical Interventions (Patient has pulse or is breathing):    Full Support       Admission Status:  I believe this patient meets INPATIENT status due to traumatic injury to the leg requiring emergent surgery.  I feel patient’s risk for adverse outcomes and need for care warrant INPATIENT evaluation and I predict the patient’s care encounter to likely last beyond 2 midnights.      Tavo Johnson, DO  01/15/22

## 2022-01-15 NOTE — ANESTHESIA PROCEDURE NOTES
Airway  Urgency: elective    Date/Time: 1/15/2022 3:57 PM  Airway not difficult    General Information and Staff    Patient location during procedure: OR  Anesthesiologist: Marvin Moore Jr., MD    Indications and Patient Condition  Indications for airway management: airway protection    Preoxygenated: yes  Mask difficulty assessment: 0 - not attempted    Final Airway Details  Final airway type: supraglottic airway      Successful airway: I-gel  Size 4    Number of attempts at approach: 1  Assessment: lips, teeth, and gum same as pre-op    Additional Comments  LMA placed without difficulty, ventilation with assist, equal breath sounds and symmetric chest rise and fall

## 2022-01-15 NOTE — PLAN OF CARE
Goal Outcome Evaluation:              Outcome Summary: Patient arrived to floor around 1725 post fasciotomy. Complaint of pain somewhat addressed with PRN medication. Patient passed bedside dysphagia screen. Normal saline infusing at 100ml/hr. VSS on room, COVID swab sent, airborne and contact precautions maintained, will continue to monitor.

## 2022-01-16 LAB
ABO GROUP BLD: NORMAL
RH BLD: POSITIVE

## 2022-01-16 PROCEDURE — 0 CEFAZOLIN IN DEXTROSE 2-4 GM/100ML-% SOLUTION: Performed by: ORTHOPAEDIC SURGERY

## 2022-01-16 PROCEDURE — 99024 POSTOP FOLLOW-UP VISIT: CPT | Performed by: ORTHOPAEDIC SURGERY

## 2022-01-16 PROCEDURE — 97605 NEG PRS WND THER DME<=50SQCM: CPT

## 2022-01-16 PROCEDURE — 25010000002 ENOXAPARIN PER 10 MG: Performed by: ORTHOPAEDIC SURGERY

## 2022-01-16 PROCEDURE — 25010000002 HYDROMORPHONE PER 4 MG: Performed by: INTERNAL MEDICINE

## 2022-01-16 PROCEDURE — 97166 OT EVAL MOD COMPLEX 45 MIN: CPT

## 2022-01-16 PROCEDURE — 99233 SBSQ HOSP IP/OBS HIGH 50: CPT | Performed by: HOSPITALIST

## 2022-01-16 PROCEDURE — 97535 SELF CARE MNGMENT TRAINING: CPT

## 2022-01-16 PROCEDURE — 97161 PT EVAL LOW COMPLEX 20 MIN: CPT

## 2022-01-16 RX ADMIN — HYDROMORPHONE HYDROCHLORIDE 0.5 MG: 1 INJECTION, SOLUTION INTRAMUSCULAR; INTRAVENOUS; SUBCUTANEOUS at 11:52

## 2022-01-16 RX ADMIN — ENOXAPARIN SODIUM 40 MG: 40 INJECTION SUBCUTANEOUS at 20:57

## 2022-01-16 RX ADMIN — HYDROCODONE BITARTRATE AND ACETAMINOPHEN 1 TABLET: 5; 325 TABLET ORAL at 20:57

## 2022-01-16 RX ADMIN — SODIUM CHLORIDE 100 ML/HR: 9 INJECTION, SOLUTION INTRAVENOUS at 14:13

## 2022-01-16 RX ADMIN — HYDROMORPHONE HYDROCHLORIDE 0.5 MG: 1 INJECTION, SOLUTION INTRAMUSCULAR; INTRAVENOUS; SUBCUTANEOUS at 23:50

## 2022-01-16 RX ADMIN — HYDROCODONE BITARTRATE AND ACETAMINOPHEN 1 TABLET: 5; 325 TABLET ORAL at 01:56

## 2022-01-16 RX ADMIN — SODIUM CHLORIDE, PRESERVATIVE FREE 10 ML: 5 INJECTION INTRAVENOUS at 08:12

## 2022-01-16 RX ADMIN — SODIUM CHLORIDE, PRESERVATIVE FREE 10 ML: 5 INJECTION INTRAVENOUS at 20:58

## 2022-01-16 RX ADMIN — HYDROCODONE BITARTRATE AND ACETAMINOPHEN 1 TABLET: 5; 325 TABLET ORAL at 16:34

## 2022-01-16 RX ADMIN — HYDROCODONE BITARTRATE AND ACETAMINOPHEN 1 TABLET: 5; 325 TABLET ORAL at 08:10

## 2022-01-16 RX ADMIN — CEFAZOLIN SODIUM 2 G: 2 INJECTION, SOLUTION INTRAVENOUS at 02:10

## 2022-01-16 NOTE — THERAPY EVALUATION
Patient Name: Jagdish Kennedy  : 1990    MRN: [id number]                              Today's Date: 2022       Admit Date: 1/15/2022    Visit Dx:     ICD-10-CM ICD-9-CM   1. Traumatic compartment syndrome of left lower extremity, initial encounter (HCC)  T79.A22A 958.92   2. COVID-19  U07.1 079.89   3. Leukocytosis, unspecified type  D72.829 288.60   4. Elevated CK  R74.8 790.5     Patient Active Problem List   Diagnosis   • Traumatic compartment syndrome of left lower extremity (HCC)   • Lab test positive for detection of COVID-19 virus     History reviewed. No pertinent past medical history.  Past Surgical History:   Procedure Laterality Date   • TONSILLECTOMY        General Information     Row Name 2229          OT Time and Intention    Mode of Treatment occupational therapy  -MA     Row Name 22          General Information    Patient Profile Reviewed yes  -MA     Prior Level of Function independent:; bed mobility; ADL's; transfer; all household mobility; community mobility; work; driving; shopping; home management  -MA     Existing Precautions/Restrictions fall; other (see comments)  WBAT LLE, wound vac  -MA     Barriers to Rehab medically complex  -MA     Row Name 22          Living Environment    Lives With other (see comments)  pt has 2 room mates  -MA     Row Name 2229          Home Main Entrance    Number of Stairs, Main Entrance other (see comments)  Pt lives in 3rd story apartment w/ no elevator  -MA     Row Name 22          Cognition    Orientation Status (Cognition) oriented x 4  -MA     Row Name 22          Safety Issues, Functional Mobility    Impairments Affecting Function (Mobility) endurance/activity tolerance; pain; strength  -MA           User Key  (r) = Recorded By, (t) = Taken By, (c) = Cosigned By    Initials Name Provider Type    Ann Marie Gracia OT Occupational Therapist                 Mobility/ADL's     Row  Name 01/16/22 0929          Bed Mobility    Bed Mobility supine-sit  -MA     Supine-Sit [place] (Bed Mobility) minimum assist (75% patient effort); 1 person assist; verbal cues  -MA     Bed Mobility, Safety Issues decreased use of legs for bridging/pushing  -MA     Assistive Device (Bed Mobility) bed rails; head of bed elevated  -MA     Row Name 01/16/22 0929          Transfers    Transfers sit-stand transfer  -MA     Sit-Stand [place] (Transfers) minimum assist (75% patient effort); 2 person assist; verbal cues  -MA     Row Name 01/16/22 0929          Sit-Stand Transfer    Assistive Device (Sit-Stand Transfers) walker, front-wheeled  -MA     Row Name 01/16/22 0929          Functional Mobility    Functional Mobility- Comment Deferred to PT  -MA     Row Name 01/16/22 0929          Activities of Daily Living    BADL Assessment/Intervention upper body dressing; lower body dressing  -MA     Row Name 01/16/22 0929          Mobility    Extremity Weight-bearing Status left lower extremity  -MA     Left Lower Extremity (Weight-bearing Status) weight-bearing as tolerated (WBAT)   -MA     Row Name 01/16/22 0929          Upper Body Dressing Assessment/Training    [place] Level (Upper Body Dressing) don; pajama/robe; minimum assist (75% patient effort); verbal cues  -MA     Position (Upper Body Dressing) edge of bed sitting  -MA     Comment (Upper Body Dressing) Min A for UBD d/t IV line  -MA     Row Name 01/16/22 0929          Lower Body Dressing Assessment/Training    [place] Level (Lower Body Dressing) don; socks; moderate assist (50% patient effort); verbal cues  -MA     Position (Lower Body Dressing) edge of bed sitting  -MA     Comment (Lower Body Dressing) Pt I w/ donning R sock, dep for donning L sock  -MA           User Key  (r) = Recorded By, (t) = Taken By, (c) = Cosigned By    Initials Name Provider Type    Ann Marie Gracia OT Occupational Therapist               Obj/Interventions     Row Name  01/16/22 0929          Sensory Assessment (Somatosensory)    Sensory Assessment (Somatosensory) UE sensation intact  -MA     Row Name 01/16/22 0929          Vision Assessment/Intervention    Visual Impairment/Limitations WFL  -MA     Row Name 01/16/22 0929          Range of Motion Comprehensive    General Range of Motion bilateral upper extremity ROM WFL  -MA     Row Name 01/16/22 0929          Strength Comprehensive (MMT)    General Manual Muscle Testing (MMT) Assessment no strength deficits identified  -MA     Comment, General Manual Muscle Testing (MMT) Assessment BUE grossly 5/5  -MA     Row Name 01/16/22 0929          Balance    Balance Assessment sitting static balance; sitting dynamic balance; standing static balance  -MA     Static Sitting Balance WFL; unsupported; sitting, edge of bed  -MA     Dynamic Sitting Balance WFL; unsupported; sitting, edge of bed  -MA     Static Standing Balance mild impairment; supported; standing  -MA     Balance Interventions sit to stand; occupation based/functional task  -MA           User Key  (r) = Recorded By, (t) = Taken By, (c) = Cosigned By    Initials Name Provider Type    Ann Marie Gracia OT Occupational Therapist               Goals/Plan     Row Name 01/16/22 1013          Transfer Goal 1 (OT)    Activity/Assistive Device (Transfer Goal 1, OT) bed-to-chair/chair-to-bed; toilet; commode; walker, rolling  -MA     [place] Level/Cues Needed (Transfer Goal 1, OT) contact guard assist  -MA     Time Frame (Transfer Goal 1, OT) long term goal (LTG); 10 days  -MA     Progress/Outcome (Transfer Goal 1, OT) goal ongoing  -MA     Row Name 01/16/22 1013          Dressing Goal 1 (OT)    Activity/Device (Dressing Goal 1, OT) lower body dressing  don/doff socks w/ AAD  -MA     [place]/Cues Needed (Dressing Goal 1, OT) minimum assist (75% or more patient effort); verbal cues required  -MA     Time Frame (Dressing Goal 1, OT) long term goal (LTG); 10 days  -MA      Progress/Outcome (Dressing Goal 1, OT) goal ongoing  -MA     Row Name 01/16/22 1013          Grooming Goal 1 (OT)    Activity/Device (Grooming Goal 1, OT) hair care; oral care; wash face, hands  standing sinkside  -MA     [place] (Grooming Goal 1, OT) set-up required  -MA     Time Frame (Grooming Goal 1, OT) long term goal (LTG); 10 days  -MA     Progress/Outcome (Grooming Goal 1, OT) goal ongoing  -MA           User Key  (r) = Recorded By, (t) = Taken By, (c) = Cosigned By    Initials Name Provider Type    Ann Marie rGacia, ROSHAN Occupational Therapist               Clinical Impression     Row Name 01/16/22 0929          Pain Assessment    Additional Documentation Pain Scale: Numbers Pre/Post-Treatment (Group)  -[facility] Name 01/16/22 0929          Pain Scale: Numbers Pre/Post-Treatment    Pretreatment Pain Rating 5/10  -MA     Posttreatment Pain Rating 5/10  -MA     Pain Location - Side Left  -MA     Pain Location - Orientation lower  -MA     Pain Location extremity  -MA     Pain Intervention(s) Repositioned; Ambulation/increased activity  -MA     Row Name 01/16/22 0929          Plan of Care Review    Plan of Care Reviewed With patient  -MA     Outcome Summary OT eval complete. Pt presents w/ decreased activity tolerance and increased pain limiting his ADL indepedence. Pt motivated to participate in OT eval. Pt min A for bed mobility & UBD, min Ax2 for STS w/ RW, mod A for LBD. Pt would benefit from continued skilled IPOT services to address current functional deficits. Recommend IRF at discharge pending further functional progress.  -MA     Row Name 01/16/22 0929          Therapy Assessment/Plan (OT)    Rehab Potential (OT) good, to achieve stated therapy goals  -MA     Criteria for Skilled Therapeutic Interventions Met (OT) yes; skilled treatment is necessary  -MA     Therapy Frequency (OT) daily  -MA     Row Name 01/16/22 0929          Therapy Plan Review/Discharge Plan (OT)    Anticipated Discharge  Disposition (OT) inpatient rehabilitation facility  -MA     Row Name 01/16/22 0929          Vital Signs    Pre Systolic BP Rehab --  VSS - RN cleared OT eval  -MA     O2 Delivery Pre Treatment room air  -MA     O2 Delivery Intra Treatment room air  -MA     O2 Delivery Post Treatment room air  -MA     Pre Patient Position Supine  -MA     Intra Patient Position Standing  -MA     Post Patient Position Sitting  -MA     Row Name 01/16/22 0929          Positioning and Restraints    Pre-Treatment Position in bed  -MA     Post Treatment Position bed  -MA     In Bed sitting EOB; with PT  -MA           User Key  (r) = Recorded By, (t) = Taken By, (c) = Cosigned By    Initials Name Provider Type    Ann Marie Gracia OT Occupational Therapist               Outcome Measures     Row Name 01/16/22 0929          How much help from another is currently needed...    Putting on and taking off regular lower body clothing? 2  -MA     Bathing (including washing, rinsing, and drying) 3  -MA     Toileting (which includes using toilet bed pan or urinal) 3  -MA     Putting on and taking off regular upper body clothing 3  -MA     Taking care of personal grooming (such as brushing teeth) 4  -MA     Eating meals 4  -MA     AM-PAC 6 Clicks Score (OT) 19  -MA           User Key  (r) = Recorded By, (t) = Taken By, (c) = Cosigned By    Initials Name Provider Type    Ann Marie Gracia OT Occupational Therapist                Occupational Therapy Education                 Title: PT OT SLP Therapies (In Progress)     Topic: Occupational Therapy (In Progress)     Point: ADL training (Done)     Description:   Instruct learner(s) on proper safety adaptation and remediation techniques during self care or transfers.   Instruct in proper use of assistive devices.              Learning Progress Summary           Patient Acceptance, E, VU by MA at 1/16/2022 1015                   Point: Home exercise program (Not Started)     Description:   Instruct  learner(s) on appropriate technique for monitoring, assisting and/or progressing therapeutic exercises/activities.              Learner Progress:  Not documented in this visit.          Point: Precautions (Done)     Description:   Instruct learner(s) on prescribed precautions during self-care and functional transfers.              Learning Progress Summary           Patient Acceptance, E, VU by MA at 1/16/2022 1015                   Point: Body mechanics (Done)     Description:   Instruct learner(s) on proper positioning and spine alignment during self-care, functional mobility activities and/or exercises.              Learning Progress Summary           Patient Acceptance, E, VU by MA at 1/16/2022 1015                               User Key     Initials Effective Dates Name Provider Type Discipline    MA 11/19/20 -  Ann Marie Palmer OT Occupational Therapist OT              OT Recommendation and Plan  Therapy Frequency (OT): daily  Plan of Care Review  Plan of Care Reviewed With: patient  Outcome Summary: OT eval complete. Pt presents w/ decreased activity tolerance and increased pain limiting his ADL indepedence. Pt motivated to participate in OT eval. Pt min A for bed mobility & UBD, min Ax2 for STS w/ RW, mod A for LBD. Pt would benefit from continued skilled IPOT services to address current functional deficits. Recommend IRF at discharge pending further functional progress.     Time Calculation:    Time Calculation- OT     Row Name 01/16/22 1015             Time Calculation- OT    OT Start Time 0929  -MA      OT Received On 01/16/22  -MA      OT Goal Re-Cert Due Date 01/26/22  -MA              Timed Charges    11596 - OT Self Care/Mgmt Minutes 8  -MA              Untimed Charges    OT Eval/Re-eval Minutes 31  -MA              Total Minutes    Timed Charges Total Minutes 8  -MA      Untimed Charges Total Minutes 31  -MA       Total Minutes 39  -MA            User Key  (r) = Recorded By, (t) = Taken By, (c) =  Cosigned By    Initials Name Provider Type    Ann Marie Gracia OT Occupational Therapist              Therapy Charges for Today     Code Description Service Date Service Provider Modifiers Qty    35429128882 HC OT SELF CARE/MGMT/TRAIN EA 15 MIN 1/16/2022 Ann Marie Palmer OT GO 1    76290288309 HC OT EVAL MOD COMPLEXITY 3 1/16/2022 Ann Marie Palmer OT GO 1    54912692311 HC OT THER SUPP EA 15 MIN 1/16/2022 Ann Marie Palmer OT GO 1               Ann Marie Palmer OT  1/16/2022

## 2022-01-16 NOTE — PROGRESS NOTES
[facility] Medicine Services  PROGRESS NOTE    Patient Name: Jagdish Kennedy  : 1990  MRN: [id number]    Date of Admission: 1/15/2022  Primary Care Physician: Provider, No Known    Subjective   Subjective     CC:  Leg injury    HPI:  Patient lying in bed, in good spirits. States he is feeling ok, some pain in his left leg but overall pain is controlled. Denies any symptoms related to his COVID-19 diagnosis.    ROS:  Gen- No fevers, chills  CV- No chest pain, palpitations  Resp- No cough, dyspnea  GI- No N/V/D, abd pain    Objective   Objective     Vital Signs:   Temp:  [96.9 °F (36.1 °C)-99.1 °F (37.3 °C)] 97.1 °F (36.2 °C)  Heart Rate:  [59-98] 87  Resp:  [16-18] 18  BP: ()/(52-89) 114/63     Physical Exam:  Constitutional: Awake, alert, no acute distress  Eyes: PERRL, sclerae anicteric, no conjunctival injection  HENT: NCAT, mucous membranes moist  Neck: Supple, trachea midline  Respiratory: Clear to auscultation bilaterally, nonlabored respirations   Cardiovascular: RRR, no murmurs, rubs, or gallops, left lower extremity wrapped with wound vac  Gastrointestinal: Positive bowel sounds, soft, nontender, nondistended  Musculoskeletal: Left calf significantly enlarged, firm, tender to palpation  Psychiatric: Appropriate affect, cooperative  Neurologic: Oriented x 3, strength symmetric in all extremities, speech clear, sensation intact in bilateral legs and feet  Skin: No rashes    Results Reviewed:  LAB RESULTS:      Lab 01/15/22  1245   WBC 12.86*   HEMOGLOBIN 15.0   HEMATOCRIT 45.6   PLATELETS 269   NEUTROS ABS 10.62*   IMMATURE GRANS (ABS) 0.05   LYMPHS ABS 1.39   MONOS ABS 0.71   EOS ABS 0.04   MCV 86.4   LACTATE 1.7         Lab 01/15/22  1245   SODIUM 139   POTASSIUM 4.2   CHLORIDE 103   CO2 26.0   ANION GAP 10.0   BUN 14   CREATININE 0.98   GLUCOSE 103*   CALCIUM 9.8         Lab 01/15/22  1245   TOTAL PROTEIN 7.8   ALBUMIN 5.20   GLOBULIN 2.6   ALT (SGPT) 21   AST  (SGOT) 20   BILIRUBIN 0.6   ALK PHOS 63                 Lab 01/15/22  1736   ABO TYPING O   RH TYPING Positive   ANTIBODY SCREEN Negative         Brief Urine Lab Results     None          Microbiology Results Abnormal     None          XR Knee 1 or 2 View Left    Result Date: 1/15/2022  EXAMINATION: XR TIBIA FIBULA 2 VW LEFT-, XR FOOT 3+ VW LEFT-, XR KNEE 1 OR 2 VW LEFT-, XR ANKLE 3+ VW LEFT-  INDICATION: injury  COMPARISON: NONE  FINDINGS: There is extensive subcutaneous edema and soft tissue reticulation noted along the medial aspect of the proximal tibia/knee. Cortical margins are otherwise intact, without evidence of acute fracture. Small suprapatellar joint effusion is present.  The ankle mortise is symmetric with an intact talar dome. No fracture or dislocation. Cortical margins of the left foot are maintained without evidence of fracture, dislocation or suspicious osseous lesion. There is no evidence of subluxation or dislocation.      Impression: Fairly extensive superficial soft tissue edema is noted along the medial aspect of the left knee and proximal tibia, with small suprapatellar joint effusion also noted. The remainder of the imaged left lower extremity otherwise demonstrates no evidence of acute osseous or articular abnormality.  This report was finalized on 1/15/2022 12:46 PM by Yordan Melendez.      XR Tibia Fibula 2 View Left    Result Date: 1/15/2022  EXAMINATION: XR TIBIA FIBULA 2 VW LEFT-, XR FOOT 3+ VW LEFT-, XR KNEE 1 OR 2 VW LEFT-, XR ANKLE 3+ VW LEFT-  INDICATION: injury  COMPARISON: NONE  FINDINGS: There is extensive subcutaneous edema and soft tissue reticulation noted along the medial aspect of the proximal tibia/knee. Cortical margins are otherwise intact, without evidence of acute fracture. Small suprapatellar joint effusion is present.  The ankle mortise is symmetric with an intact talar dome. No fracture or dislocation. Cortical margins of the left foot are maintained without  evidence of fracture, dislocation or suspicious osseous lesion. There is no evidence of subluxation or dislocation.      Impression: Fairly extensive superficial soft tissue edema is noted along the medial aspect of the left knee and proximal tibia, with small suprapatellar joint effusion also noted. The remainder of the imaged left lower extremity otherwise demonstrates no evidence of acute osseous or articular abnormality.  This report was finalized on 1/15/2022 12:46 PM by Yordan Melendez.      XR Ankle 3+ View Left    Result Date: 1/15/2022  EXAMINATION: XR TIBIA FIBULA 2 VW LEFT-, XR FOOT 3+ VW LEFT-, XR KNEE 1 OR 2 VW LEFT-, XR ANKLE 3+ VW LEFT-  INDICATION: injury  COMPARISON: NONE  FINDINGS: There is extensive subcutaneous edema and soft tissue reticulation noted along the medial aspect of the proximal tibia/knee. Cortical margins are otherwise intact, without evidence of acute fracture. Small suprapatellar joint effusion is present.  The ankle mortise is symmetric with an intact talar dome. No fracture or dislocation. Cortical margins of the left foot are maintained without evidence of fracture, dislocation or suspicious osseous lesion. There is no evidence of subluxation or dislocation.      Impression: Fairly extensive superficial soft tissue edema is noted along the medial aspect of the left knee and proximal tibia, with small suprapatellar joint effusion also noted. The remainder of the imaged left lower extremity otherwise demonstrates no evidence of acute osseous or articular abnormality.  This report was finalized on 1/15/2022 12:46 PM by Yordan Melendez.      XR Foot 3+ View Left    Result Date: 1/15/2022  EXAMINATION: XR TIBIA FIBULA 2 VW LEFT-, XR FOOT 3+ VW LEFT-, XR KNEE 1 OR 2 VW LEFT-, XR ANKLE 3+ VW LEFT-  INDICATION: injury  COMPARISON: NONE  FINDINGS: There is extensive subcutaneous edema and soft tissue reticulation noted along the medial aspect of the proximal tibia/knee. Cortical  margins are otherwise intact, without evidence of acute fracture. Small suprapatellar joint effusion is present.  The ankle mortise is symmetric with an intact talar dome. No fracture or dislocation. Cortical margins of the left foot are maintained without evidence of fracture, dislocation or suspicious osseous lesion. There is no evidence of subluxation or dislocation.      Impression: Fairly extensive superficial soft tissue edema is noted along the medial aspect of the left knee and proximal tibia, with small suprapatellar joint effusion also noted. The remainder of the imaged left lower extremity otherwise demonstrates no evidence of acute osseous or articular abnormality.  This report was finalized on 1/15/2022 12:46 PM by Yordan Melendez.            I have reviewed the medications:  Scheduled Meds:enoxaparin, 40 mg, Subcutaneous, Q24H  sodium chloride, 10 mL, Intravenous, Q12H      Continuous Infusions:sodium chloride, 100 mL/hr, Last Rate: 100 mL/hr (01/16/22 0812)      PRN Meds:.•  acetaminophen **OR** acetaminophen **OR** acetaminophen  •  HYDROcodone-acetaminophen  •  HYDROmorphone **AND** naloxone  •  ondansetron **OR** ondansetron  •  sodium chloride    Assessment/Plan   Assessment & Plan     Active Hospital Problems    Diagnosis  POA   • **Traumatic compartment syndrome of left lower extremity (HCC) [T79.A22A]  Yes   • Lab test positive for detection of COVID-19 virus [U07.1]  Yes      Resolved Hospital Problems   No resolved problems to display.        Brief Hospital Course to date:  Jagdish Kennedy is a 31 y.o. male fully vaccinated for COVID-19 who presented after traumatic injury to his left leg at work with subsequent pain and increasing swelling. He was admitted for concern of compartment syndrome; he was incidentally and asymptomatically COVID-19 positive in the ED. Patient was taken to the OR yesterday by Dr. Zhu and fasciotomy performed.     Assessment/plan     Traumatic injury to the  left leg  Compartment syndrome of the left calf/leg  - POD #1 s/p fasciotomy per Dr. Zhu to the LLE  - Wound vac in place  - PT/OT  - Wound care  - Cefazolin 2gm IV x 24 hrs post op per ortho surgery  - plan to return to OR Tuesday for wound washout and closure of fasciotomy  - Oral and IV pain medicines ordered  - Holding DVT PPx pending surgery (cannot put compression device on left leg, avoiding anticoagulants for impending surgery) -will need prophylaxis postop once cleared by orthopedics     Asymptomatic COVID-19 positive lab test  -Fully vaccinated for the same  - Repeat COVID test positive.  - Does not qualify for antiviral- asymptomatic at this time and on RA    DVT prophylaxis:  Medical and mechanical DVT prophylaxis orders are present.       AM-PAC 6 Clicks Score (PT): 18 (01/16/22 0900)    Disposition: I expect the patient to be discharged TBD.    CODE STATUS:   Code Status and Medical Interventions:   Ordered at: 01/15/22 1516     Code Status (Patient has no pulse and is not breathing):    CPR (Attempt to Resuscitate)     Medical Interventions (Patient has pulse or is breathing):    Full Support       Kristin Zhu DO  01/16/22

## 2022-01-16 NOTE — PLAN OF CARE
Goal Outcome Evaluation:  Plan of Care Reviewed With: patient        Progress: improving  Outcome Summary: PT deya completed POD #1 L LE fasciotomy.  Patient pleasant w/ good effort/motivation.  He transferred sit to stand w/ min A x 2 and ambulated 5ft w/ RW and min A x 2; gait distance limited per Ortho.  Skilled IPPT indicated to promote return to PLOF.  Pt. may benefit from IP rehab at D/C, pending progress, to further promote return to high PLOF and safely negotiate 3 flights of stairs to home entrance.

## 2022-01-16 NOTE — THERAPY WOUND CARE TREATMENT
Acute Care - Wound/Debridement Initial Evaluation  [facility]     Patient Name: Jagdish Kennedy  : 1990  MRN: [id number]  Today's Date: 2022                Admit Date: 1/15/2022    Visit Dx:    ICD-10-CM ICD-9-CM   1. Traumatic compartment syndrome of left lower extremity, initial encounter ([facility])  T79.A22A 958.92   2. COVID-19  U07.1 079.89   3. Leukocytosis, unspecified type  D72.829 288.60   4. Elevated CK  R74.8 790.5       Patient Active Problem List   Diagnosis   • Traumatic compartment syndrome of left lower extremity (HCC)   • Lab test positive for detection of COVID-19 virus        History reviewed. No pertinent past medical history.     Past Surgical History:   Procedure Laterality Date   • TONSILLECTOMY             Wound 01/15/22 Left lower leg Incision (Active)   Dressing Appearance intact; moist drainage 22 1200   Closure MYRTLE 22 1010   Base dressing in place, unable to visualize 22 1200   Periwound Care dry periwound area maintained 22 1010   Wound Output (mL) 120 22 1200         WOUND DEBRIDEMENT                     PT Assessment (last 12 hours)     PT Evaluation and Treatment     Row Name 22 1200          Physical Therapy Time and Intention    Subjective Information no complaints  -     Document Type wound care; evaluation  -     Mode of Treatment physical therapy; individual therapy  -     Row Name 22 1200          General Information    Patient Profile Reviewed yes  -MC     Patient Observations alert; cooperative; agree to therapy  -     Row Name 22 1200          Cognition    Orientation Status (Cognition) oriented x 4  -     Row Name 22 1200          Pain Scale: Numbers Pre/Post-Treatment    Pretreatment Pain Rating 2/10  -MC     Posttreatment Pain Rating 2/10  -MC     Pain Location - Side Left  -     Pain Location - Orientation lower  -     Pain Location extremity  -     Row Name 22 1200          Pain  Scale: Word Pre/Post-Treatment    Pain Intervention(s) Repositioned  -     Row Name 01/16/22 1200          Wound 01/15/22 Left lower leg Incision    Wound - Properties Group Placement Date: 01/15/22  - Present on Hospital Admission: N  -SH Side: Left  -SH Orientation: lower  -SH Location: leg  -SH Primary Wound Type: Incision  -SH     Dressing Appearance intact; moist drainage  -     Base dressing in place, unable to visualize  -     Wound Output (mL) 120  45mL prevena canister + new vac canister  -     Retired Wound - Properties Group Date first assessed: 01/15/22  - Present on Hospital Admission: N  -SH Side: Left  -SH Location: leg  -SH Primary Wound Type: Incision  -SH     Row Name 01/16/22 1200          Coping    Observed Emotional State calm; cooperative; pleasant  -     Verbalized Emotional State acceptance  -     Row Name 01/16/22 1200          Plan of Care Review    Plan of Care Reviewed With patient  -     Outcome Summary PT wound care eval complete. Pt with prevena vac dressing placed by Dr. Zhu. Pt had some corrie bleeding, overwhelming the prevena vac canister. This allowed for some bleeding under the drape border. PT attached the prevena dressing to a hospital wound vac unit, which immediately pulled off an additional 75mL of bright red blood. Using the hospital unit should allow for the prevena dressing to stay in place until the patient's next procedure. Will continue to check daily for appropriate vac seal and function.  -     Row Name 01/16/22 1200          Physical Therapy Goals    Wound Care Goal Selection (PT) wound care, PT goal 1  -     Row Name 01/16/22 1200          Wound Care Goal 1 (PT)    Wound Care Goal 1 (PT) Pt will demonstrate independence with vac troubleshooting.  -     Time Frame (Wound Care Goal 1, PT) 10 days  -     Row Name 01/16/22 1200          Positioning and Restraints    Pre-Treatment Position sitting in chair/recliner  -     Post Treatment  Position chair  -     In Chair reclined; call light within reach; encouraged to call for assist  -     Row Name 01/16/22 1200          Therapy Assessment/Plan (PT)    Patient/Family Therapy Goals Statement (PT) Promote healing  -     Rehab Potential (PT) good, to achieve stated therapy goals  -     Criteria for Skilled Interventions Met (PT) yes; meets criteria  -           User Key  (r) = Recorded By, (t) = Taken By, (c) = Cosigned By    Initials Name Provider Type     Haase, Sherri L, RN Registered Nurse    Amy Herrera, PT Physical Therapist              Physical Therapy Education                 Title: PT OT SLP Therapies (In Progress)     Topic: Physical Therapy (Done)     Point: Mobility training (Done)     Learning Progress Summary           Patient Eager, E,D, VU by MB at 1/16/2022 1020                   Point: Home exercise program (Done)     Learning Progress Summary           Patient Eager, E,D, VU by MB at 1/16/2022 1020                   Point: Body mechanics (Done)     Learning Progress Summary           Patient Eager, E,D, VU by MB at 1/16/2022 1020                   Point: Precautions (Done)     Learning Progress Summary           Patient Eager, E,D, VU by MB at 1/16/2022 1020                               User Key     Initials Effective Dates Name Provider Type Discipline    MB 06/16/21 -  Geri Mejia, PT Physical Therapist PT                Recommendation and Plan  Anticipated Discharge Disposition (PT): inpatient rehabilitation facility  Planned Therapy Interventions (PT): wound care, patient/family education  Therapy Frequency (PT): daily  Plan of Care Reviewed With: patient           Outcome Summary: PT wound care eval complete. Pt with prevena vac dressing placed by Dr. Zhu. Pt had some corrie bleeding, overwhelming the prevena vac canister. This allowed for some bleeding under the drape border. PT attached the prevena dressing to a hospital wound vac unit, which  immediately pulled off an additional 75mL of bright red blood. Using the hospital unit should allow for the prevena dressing to stay in place until the patient's next procedure. Will continue to check daily for appropriate vac seal and function.  Plan of Care Reviewed With: patient            Time Calculation   PT Charges     Row Name 01/16/22 1200 01/16/22 1021          Time Calculation    Start Time 1200  - 0938  -MB     PT Received On -- 01/16/22  -MB     PT Goal Re-Cert Due Date 01/26/22  - 01/26/22  -MB            Untimed Charges    PT Eval/Re-eval Minutes -- 46  -MB     Wound Care 38875 Neg Press (DME) wound TO 50 sqcm  -MC --     68566-Mdi Pressure wound to 50 sqcm 40  -MC --            Total Minutes    Untimed Charges Total Minutes 40  -MC 46  -MB      Total Minutes 40  -MC 46  -MB           User Key  (r) = Recorded By, (t) = Taken By, (c) = Cosigned By    Initials Name Provider Type    Geri Bangura, PT Physical Therapist     Amy Rader, PT Physical Therapist                  Therapy Charges for Today     Code Description Service Date Service Provider Modifiers Qty    92126792448 HC PT NEG PRESS WOUND TO 50SQCM DME3 1/16/2022 Amy Rader, PT GP 1            PT G-Codes  Outcome Measure Options: AM-PAC 6 Clicks Basic Mobility (PT)  AM-PAC 6 Clicks Score (PT): 18  AM-PAC 6 Clicks Score (OT): 19       Amy Rader PT  1/16/2022

## 2022-01-16 NOTE — PLAN OF CARE
Goal Outcome Evaluation:  Plan of Care Reviewed With: patient           Outcome Summary: PT wound care eval complete. Pt with prevena vac dressing placed by Dr. Zhu. Pt had some corrie bleeding, overwhelming the prevena vac canister. This allowed for some bleeding under the drape border. PT attached the prevena dressing to a hospital wound vac unit, which immediately pulled off an additional 75mL of bright red blood. Using the hospital unit should allow for the prevena dressing to stay in place until the patient's next procedure. Will continue to check daily for appropriate vac seal and function.

## 2022-01-16 NOTE — PLAN OF CARE
Goal Outcome Evaluation:              Outcome Summary: VSS on room air, patient up in chair with PT. Wound vac managed by PT wound care, anticipate debridement and closure of fasciotomy on Tuesday. Normal saline infusing at 100ml/hr. Complaints of pain managed with PRN medications. Patient reeducated about incentive spirometer, deep breathing, elevation of left leg. Anticipate MRI of left knee. Airborne and contact precautions maintained, will continue to monitor.

## 2022-01-16 NOTE — PLAN OF CARE
Goal Outcome Evaluation:      VSS with mild hypotension post pain medication. NSR on telemetry and pain well controlled this shift.  Wound vac and bandage in place.  Good pedal pulses and affected lower limb with no discoloration or s/s of restricted blood flow. NS at 100 mL / hr and good UOP. We will continue with plan of care.

## 2022-01-16 NOTE — PROGRESS NOTES
"Orthopaedic Surgery Progress Note     LOS: 1 day   Patient Care Team:  Shakila, Ayse Known as PCP - General    POD 1    Subjective     Interval History:   Patient Reports: Patient awake eating breakfast and on his laptop computer.  He appears comfortable.  His only new complaint is some medial sided left knee pain.  His left lower leg feels better.    Objective     Vital Signs:  Temp (24hrs), Av °F (36.7 °C), Min:96.9 °F (36.1 °C), Max:99.1 °F (37.3 °C)    /63 (BP Location: Left arm, Patient Position: Sitting)   Pulse 87   Temp 97.1 °F (36.2 °C) (Oral)   Resp 18   Ht 190.5 cm (75\")   Wt 113 kg (250 lb)   SpO2 96%   BMI 31.25 kg/m²     Labs:  Lab Results (last 24 hours)     Procedure Component Value Units Date/Time    COVID PRE-OP / PRE-PROCEDURE SCREENING ORDER (NO ISOLATION) - Swab, Nasopharynx [[id number]]  (Abnormal) Collected: 01/15/22 1941    Specimen: Swab from Nasopharynx Updated: 01/15/22 2044    Narrative:      The following orders were created for panel order COVID PRE-OP / PRE-PROCEDURE SCREENING ORDER (NO ISOLATION) - Swab, Nasopharynx.  Procedure                               Abnormality         Status                     ---------                               -----------         ------                     COVID-19 and FLU A/B PCR...[[id number]]  Abnormal            Final result                 Please view results for these tests on the individual orders.    COVID-19 and FLU A/B PCR - Swab, Nasopharynx [[id number]]  (Abnormal) Collected: 01/15/22 1941    Specimen: Swab from Nasopharynx Updated: 01/15/22 2044     COVID19 Detected     Influenza A PCR Not Detected     Influenza B PCR Not Detected    Narrative:      Fact sheet for providers: https://www.fda.gov/media/634338/download    Fact sheet for patients: https://www.fda.gov/media/336334/download    Test performed by PCR.  Influenza A and Influenza B negative results should be considered presumptive in samples that have a positive " SARS-CoV-2 result.    Competitive inhibition studies showed that SARS-CoV-2 virus, when present at concentrations above 3.6E+04 copies/mL, can inhibit the detection and amplification of influenza A and influenza B virus RNA if present at or below 1.8E+02 copies/mL or 4.9E+02 copies/mL, respectively, and may lead to false negative influenza virus results. If co-infection with influenza A or influenza B virus is suspected in samples with a positive SARS-CoV-2 result, the sample should be re-tested with another FDA cleared, approved, or authorized influenza test, if influenza virus detection would change clinical management.    Myoglobin, Serum [[id number]]  (Normal) Collected: 01/15/22 1245    Specimen: Blood Updated: 01/15/22 1556     Myoglobin 52.1 ng/mL     Narrative:      Results may be falsely decreased if patient taking Biotin.      COVID-19, FLU A/B, RSV PCR - Swab, Nasopharynx [[id number]]  (Abnormal) Collected: 01/15/22 1249    Specimen: Swab from Nasopharynx Updated: 01/15/22 1400     COVID19 Detected     Influenza A PCR Not Detected     Influenza B PCR Not Detected     RSV, PCR Not Detected    Narrative:      Fact sheet for providers: https://www.fda.gov/media/837094/download    Fact sheet for patients: https://www.fda.gov/media/203031/download    Test performed by PCR.    Comprehensive Metabolic Panel [[id number]]  (Abnormal) Collected: 01/15/22 1245    Specimen: Blood Updated: 01/15/22 1310     Glucose 103 mg/dL      BUN 14 mg/dL      Creatinine 0.98 mg/dL      Sodium 139 mmol/L      Potassium 4.2 mmol/L      Comment: Slight hemolysis detected by analyzer. Results may be affected.        Chloride 103 mmol/L      CO2 26.0 mmol/L      Calcium 9.8 mg/dL      Total Protein 7.8 g/dL      Albumin 5.20 g/dL      ALT (SGPT) 21 U/L      AST (SGOT) 20 U/L      Alkaline Phosphatase 63 U/L      Total Bilirubin 0.6 mg/dL      eGFR Non African Amer 90 mL/min/1.73      Globulin 2.6 gm/dL      Comment: Calculated Result         A/G Ratio 2.0 g/dL      BUN/Creatinine Ratio 14.3     Anion Gap 10.0 mmol/L     Narrative:      GFR Normal >60  Chronic Kidney Disease <60  Kidney Failure <15      CK [[id number]]  (Abnormal) Collected: 01/15/22 1245    Specimen: Blood Updated: 01/15/22 1310     Creatine Kinase 292 U/L     Lactic Acid, Plasma [[id number]]  (Normal) Collected: 01/15/22 1245    Specimen: Blood Updated: 01/15/22 1306     Lactate 1.7 mmol/L      Comment: Falsely depressed results may occur on samples drawn from patients receiving N-Acetylcysteine (NAC) or Metamizole.       CBC & Differential [[id number]]  (Abnormal) Collected: 01/15/22 1245    Specimen: Blood Updated: 01/15/22 1253    Narrative:      The following orders were created for panel order CBC & Differential.  Procedure                               Abnormality         Status                     ---------                               -----------         ------                     CBC Auto Differential[[id number]]        Abnormal            Final result                 Please view results for these tests on the individual orders.    CBC Auto Differential [[id number]]  (Abnormal) Collected: 01/15/22 1245    Specimen: Blood Updated: 01/15/22 1253     WBC 12.86 10*3/mm3      RBC 5.28 10*6/mm3      Hemoglobin 15.0 g/dL      Hematocrit 45.6 %      MCV 86.4 fL      MCH 28.4 pg      MCHC 32.9 g/dL      RDW 14.2 %      RDW-SD 45.3 fl      MPV 10.7 fL      Platelets 269 10*3/mm3      Neutrophil % 82.6 %      Lymphocyte % 10.8 %      Monocyte % 5.5 %      Eosinophil % 0.3 %      Basophil % 0.4 %      Immature Grans % 0.4 %      Neutrophils, Absolute 10.62 10*3/mm3      Lymphocytes, Absolute 1.39 10*3/mm3      Monocytes, Absolute 0.71 10*3/mm3      Eosinophils, Absolute 0.04 10*3/mm3      Basophils, Absolute 0.05 10*3/mm3      Immature Grans, Absolute 0.05 10*3/mm3      nRBC 0.0 /100 WBC           Imaging:  XR Tibia Fibula 2 View Left, XR Ankle 3+ View Left, XR Foot 3+ View Left, XR Knee  1 or 2 View Left  Narrative: EXAMINATION: XR TIBIA FIBULA 2 VW LEFT-, XR FOOT 3+ VW LEFT-, XR KNEE 1  OR 2 VW LEFT-, XR ANKLE 3+ VW LEFT-      INDICATION: injury      COMPARISON: NONE     FINDINGS: There is extensive subcutaneous edema and soft tissue  reticulation noted along the medial aspect of the proximal tibia/knee.  Cortical margins are otherwise intact, without evidence of acute  fracture. Small suprapatellar joint effusion is present.     The ankle mortise is symmetric with an intact talar dome. No fracture or  dislocation. Cortical margins of the left foot are maintained without  evidence of fracture, dislocation or suspicious osseous lesion. There is  no evidence of subluxation or dislocation.     Impression: Fairly extensive superficial soft tissue edema is noted  along the medial aspect of the left knee and proximal tibia, with small  suprapatellar joint effusion also noted. The remainder of the imaged  left lower extremity otherwise demonstrates no evidence of acute osseous  or articular abnormality.     This report was finalized on 1/15/2022 12:46 PM by Yordan Melendez.          Physical Exam:  The left fasciotomy wound VAC has lost its suction seal.  Distally he has minimal swelling.  Foot flexion plantar and dorsiflexion intact.  Sensation intact.  Pulses intact.  He has ecchymosis about the medial knee.  His knee ligament exam is stable.    Assessment/Plan   Postop day 1 status post fasciotomy left lower extremity  Left knee contusion  Continue physical therapy weight-bear as tolerated.  Consult wound care regarding the wound VAC seal  Plan return to the operating room most likely Tuesday for wound washout and closure of the fasciotomy    Chance Zhu MD  01/16/22  09:20 EST

## 2022-01-16 NOTE — THERAPY EVALUATION
Patient Name: Jagdish Kennedy  : 1990    MRN: [id number]                              Today's Date: 2022       Admit Date: 1/15/2022    Visit Dx:     ICD-10-CM ICD-9-CM   1. Traumatic compartment syndrome of left lower extremity, initial encounter (HCC)  T79.A22A 958.92   2. COVID-19  U07.1 079.89   3. Leukocytosis, unspecified type  D72.829 288.60   4. Elevated CK  R74.8 790.5     Patient Active Problem List   Diagnosis   • Traumatic compartment syndrome of left lower extremity (HCC)   • Lab test positive for detection of COVID-19 virus     History reviewed. No pertinent past medical history.  Past Surgical History:   Procedure Laterality Date   • TONSILLECTOMY        General Information     Row Name 22          Physical Therapy Time and Intention    Document Type evaluation  -MB     Mode of Treatment physical therapy  -MB     Row Name 22          General Information    Patient Profile Reviewed yes  -MB     Prior Level of Function independent:; bed mobility; ADL's; transfer; gait; all household mobility; community mobility; driving; using stairs; work  works FT at [employer]  -MB     Existing Precautions/Restrictions fall; other (see comments)  WBAT LLE w/ minimal ambulation per Ortho, wound vac  -MB     Barriers to Rehab medically complex  -MB     Row Name 22          Living Environment    Lives With other (see comments)  roommates  -MB     Row Name 22 09          Home Main Entrance    Number of Stairs, Main Entrance other (see comments)  3 flights of stairs to 3rd floor apartment, no elevator  -MB     Row Name 22          Stairs Within Home, Primary    Number of Stairs, Within Home, Primary none  -MB     Row Name 22          Cognition    Orientation Status (Cognition) oriented x 4  -MB     Row Name 22          Safety Issues, Functional Mobility    Impairments Affecting Function (Mobility) endurance/activity tolerance; pain;  strength  -MB           User Key  (r) = Recorded By, (t) = Taken By, (c) = Cosigned By    Initials Name Provider Type    Geri Bangura, PT Physical Therapist               Mobility     Row Name 01/16/22 0938          Bed Mobility    Comment (Bed Mobility) Pt. received sitting EOB.  -MB     Row Name 01/16/22 0938          Transfers    Comment (Transfers) VCs for safe hand placement.  No LOB or instability.  -MB     Row Name 01/16/22 0938          Sit-Stand Transfer    Sit-Stand [place] (Transfers) minimum assist (75% patient effort); 2 person assist; verbal cues  -MB     Assistive Device (Sit-Stand Transfers) walker, front-wheeled  -MB     Row Name 01/16/22 0938          Gait/Stairs (Locomotion)    [place] Level (Gait) minimum assist (75% patient effort); 2 person assist; verbal cues  -MB     Assistive Device (Gait) walker, front-wheeled  -MB     Distance in Feet (Gait) 5  -MB     Deviations/Abnormal Patterns (Gait) left sided deviations; alex decreased; weight shifting decreased; gait speed decreased  -MB     Comment (Gait/Stairs) Pt. ambulated w/ step to gait pattern w/ decreased stance phase LLE, but good stability.  Gait distance limited per Ortho.  -MB     Row Name 01/16/22 0938          Mobility    Left Lower Extremity (Weight-bearing Status) weight-bearing as tolerated (WBAT)   minimal ambulation per Ortho  -MB           User Key  (r) = Recorded By, (t) = Taken By, (c) = Cosigned By    Initials Name Provider Type    Geri Bangura, PT Physical Therapist               Obj/Interventions     Row Name 01/16/22 0938          Strength Comprehensive (MMT)    General Manual Muscle Testing (MMT) Assessment lower extremity strength deficits identified  -MB     Comment, General Manual Muscle Testing (MMT) Assessment BLEs grossly 5/5, L knee ext and ankle DF/PF limited by pain/edema  -MB     Row Name 01/16/22 0938          Motor Skills    Therapeutic Exercise ankle  -MB     Row Name 01/16/22  0938          Ankle (Therapeutic Exercise)    Ankle (Therapeutic Exercise) AROM (active range of motion)  -MB     Ankle AROM (Therapeutic Exercise) dorsiflexion; plantarflexion; 5 repetitions  -MB     Row Name 01/16/22 0938          Balance    Balance Assessment sitting static balance; sitting dynamic balance; standing static balance; standing dynamic balance  -MB     Static Sitting Balance WNL  -MB     Dynamic Sitting Balance WNL  -MB     Static Standing Balance mild impairment; supported  -MB     Dynamic Standing Balance mild impairment; supported  -MB     Balance Interventions standing; sit to stand; weight shifting activity  -MB     Row Name 01/16/22 0938          Sensory Assessment (Somatosensory)    Sensory Assessment (Somatosensory) LE sensation intact  -MB           User Key  (r) = Recorded By, (t) = Taken By, (c) = Cosigned By    Initials Name Provider Type    Geri Bangura, PT Physical Therapist               Goals/Plan     Row Name 01/16/22 0938          Bed Mobility Goal 1 (PT)    Activity/Assistive Device (Bed Mobility Goal 1, PT) bed mobility activities, all  -MB     [place] Level/Cues Needed (Bed Mobility Goal 1, PT) independent  -MB     Time Frame (Bed Mobility Goal 1, PT) 10 days  -MB     Progress/Outcomes (Bed Mobility Goal 1, PT) goal ongoing  -MB     Row Name 01/16/22 0938          Transfer Goal 1 (PT)    Activity/Assistive Device (Transfer Goal 1, PT) transfers, all  -MB     [place] Level/Cues Needed (Transfer Goal 1, PT) independent  -MB     Time Frame (Transfer Goal 1, PT) 10 days  -MB     Progress/Outcome (Transfer Goal 1, PT) goal ongoing  -MB     Row Name 01/16/22 0938          Gait Training Goal 1 (PT)    Activity/Assistive Device (Gait Training Goal 1, PT) gait (walking locomotion)  -MB     [place] Level (Gait Training Goal 1, PT) independent  -MB     Distance (Gait Training Goal 1, PT) 150  -MB     Time Frame (Gait Training Goal 1, PT) 10 days  -MB      Progress/Outcome (Gait Training Goal 1, PT) goal ongoing  -MB     Row Name 01/16/22 0938          Stairs Goal 1 (PT)    Activity/Assistive Device (Stairs Goal 1, PT) stairs, all skills; using handrail, left; using handrail, right  -MB     [place] Level/Cues Needed (Stairs Goal 1, PT) supervision required  -MB     Number of Stairs (Stairs Goal 1, PT) 3 flights  -MB     Time Frame (Stairs Goal 1, PT) by discharge  -MB     Progress/Outcome (Stairs Goal 1, PT) goal ongoing  -MB           User Key  (r) = Recorded By, (t) = Taken By, (c) = Cosigned By    Initials Name Provider Type    Geri Bangura, PT Physical Therapist               Clinical Impression     Row Name 01/16/22 0938          Pain    Additional Documentation Pain Scale: Numbers Pre/Post-Treatment (Group)  -MB     Row Name 01/16/22 0938          Pain Scale: Numbers Pre/Post-Treatment    Pretreatment Pain Rating 3/10  -MB     Posttreatment Pain Rating 5/10  -MB     Pain Location - Side Left  -MB     Pain Location - Orientation lower  -MB     Pain Location extremity  -MB     Pain Intervention(s) Medication (See MAR); Ambulation/increased activity; Repositioned  -MB     Row Name 01/16/22 0938          Plan of Care Review    Plan of Care Reviewed With patient  -MB     Progress improving  -MB     Outcome Summary PT eval completed POD #1 L LE fasciotomy.  Patient pleasant w/ good effort/motivation.  He transferred sit to stand w/ min A x 2 and ambulated 5ft w/ RW and min A x 2; gait distance limited per Ortho.  Skilled IPPT indicated to promote return to PLOF.  Pt. may benefit from IP rehab at D/C, pending progress, to further promote return to high PLOF and safely negotiate 3 flights of stairs to home entrance.  -MB     Row Name 01/16/22 0938          Therapy Assessment/Plan (PT)    Patient/Family Therapy Goals Statement (PT) Return to PLOF/home/work.  -MB     Rehab Potential (PT) good, to achieve stated therapy goals  -MB     Criteria for Skilled  Interventions Met (PT) yes; meets criteria; skilled treatment is necessary  -MB     Row Name 01/16/22 0938          Vital Signs    Pre Systolic BP Rehab 114  -MB     Pre Treatment Diastolic BP 63  -MB     Pre SpO2 (%) 98  -MB     O2 Delivery Pre Treatment room air  -MB     O2 Delivery Intra Treatment room air  -MB     Post SpO2 (%) 98  -MB     Pre Patient Position Sitting  -MB     Intra Patient Position Standing  -MB     Post Patient Position Sitting  -MB     Row Name 01/16/22 0938          Positioning and Restraints    Pre-Treatment Position in bed  -MB     Post Treatment Position chair  -MB     In Chair notified nsg; reclined; call light within reach; encouraged to call for assist; exit alarm on; waffle cushion; on mechanical lift sling; legs elevated; heels elevated  -MB           User Key  (r) = Recorded By, (t) = Taken By, (c) = Cosigned By    Initials Name Provider Type    Geri Bangura PT Physical Therapist               Outcome Measures     Row Name 01/16/22 0938          How much help from another person do you currently need...    Turning from your back to your side while in flat bed without using bedrails? 4  -MB     Moving from lying on back to sitting on the side of a flat bed without bedrails? 3  -MB     Moving to and from a bed to a chair (including a wheelchair)? 3  -MB     Standing up from a chair using your arms (e.g., wheelchair, bedside chair)? 3  -MB     Climbing 3-5 steps with a railing? 2  -MB     To walk in hospital room? 3  -MB     AM-PAC 6 Clicks Score (PT) 18  -MB     Row Name 01/16/22 0938          Functional Assessment    Outcome Measure Options AM-PAC 6 Clicks Basic Mobility (PT)  -MB           User Key  (r) = Recorded By, (t) = Taken By, (c) = Cosigned By    Initials Name Provider Type    Geri Bangura PT Physical Therapist                             Physical Therapy Education                 Title: PT OT SLP Therapies (In Progress)     Topic: Physical Therapy  (Done)     Point: Mobility training (Done)     Learning Progress Summary           Patient MATTHIAS Ball,D, VU by MB at 1/16/2022 1020                   Point: Home exercise program (Done)     Learning Progress Summary           Patient MATTHIAS Ball,D, VU by MB at 1/16/2022 1020                   Point: Body mechanics (Done)     Learning Progress Summary           Patient MATTHIAS Ball,D, VU by MB at 1/16/2022 1020                   Point: Precautions (Done)     Learning Progress Summary           Patient MATTHIAS Ball,D, VU by MB at 1/16/2022 1020                               User Key     Initials Effective Dates Name Provider Type Discipline    MB 06/16/21 -  Geri Mejia, PT Physical Therapist PT              PT Recommendation and Plan  Planned Therapy Interventions (PT): balance training, bed mobility training, patient/family education, stair training, strengthening, transfer training  Plan of Care Reviewed With: patient  Progress: improving  Outcome Summary: PT eval completed POD #1 L LE fasciotomy.  Patient pleasant w/ good effort/motivation.  He transferred sit to stand w/ min A x 2 and ambulated 5ft w/ RW and min A x 2; gait distance limited per Ortho.  Skilled IPPT indicated to promote return to PLOF.  Pt. may benefit from IP rehab at D/C, pending progress, to further promote return to high PLOF and safely negotiate 3 flights of stairs to home entrance.     Time Calculation:    PT Charges     Row Name 01/16/22 1021             Time Calculation    Start Time 0938  -MB      PT Received On 01/16/22  -MB      PT Goal Re-Cert Due Date 01/26/22  -MB              Untimed Charges    PT Eval/Re-eval Minutes 46  -MB              Total Minutes    Untimed Charges Total Minutes 46  -MB       Total Minutes 46  -MB            User Key  (r) = Recorded By, (t) = Taken By, (c) = Cosigned By    Initials Name Provider Type    Geri Bangura, PT Physical Therapist              Therapy Charges for Today     Code Description Service  Date Service Provider Modifiers Qty    38403314880 HC PT EVAL LOW COMPLEXITY 4 1/16/2022 Geri Mejia, PT GP 1    47939155159 HC PT THER SUPP EA 15 MIN 1/16/2022 Geri Mejia, PT GP 1          PT G-Codes  Outcome Measure Options: AM-PAC 6 Clicks Basic Mobility (PT)  AM-PAC 6 Clicks Score (PT): 18  AM-PAC 6 Clicks Score (OT): 19    Geri Mejia, PT  1/16/2022

## 2022-01-16 NOTE — PLAN OF CARE
Goal Outcome Evaluation:  Plan of Care Reviewed With: patient           Outcome Summary: OT eval complete. Pt presents w/ decreased activity tolerance and increased pain limiting his ADL indepedence. Pt motivated to participate in OT eval. Pt min A for bed mobility & UBD, min Ax2 for STS w/ RW, mod A for LBD. Pt would benefit from continued skilled IPOT services to address current functional deficits. Recommend IRF at discharge pending further functional progress.

## 2022-01-17 ENCOUNTER — APPOINTMENT (OUTPATIENT)
Dept: MRI IMAGING | Facility: HOSPITAL | Age: 32
End: 2022-01-17

## 2022-01-17 ENCOUNTER — APPOINTMENT (OUTPATIENT)
Dept: CARDIOLOGY | Facility: HOSPITAL | Age: 32
End: 2022-01-17

## 2022-01-17 ENCOUNTER — ANESTHESIA EVENT (OUTPATIENT)
Dept: PERIOP | Facility: HOSPITAL | Age: 32
End: 2022-01-17

## 2022-01-17 LAB
ALBUMIN SERPL-MCNC: 3.9 G/DL (ref 3.5–5.2)
ALBUMIN/GLOB SERPL: 1.8 G/DL
ALP SERPL-CCNC: 53 U/L (ref 39–117)
ALT SERPL W P-5'-P-CCNC: 14 U/L (ref 1–41)
ANION GAP SERPL CALCULATED.3IONS-SCNC: 7 MMOL/L (ref 5–15)
AST SERPL-CCNC: 18 U/L (ref 1–40)
BH CV LOWER VASCULAR LEFT COMMON FEMORAL AUGMENT: NORMAL
BH CV LOWER VASCULAR LEFT COMMON FEMORAL COMPRESS: NORMAL
BH CV LOWER VASCULAR LEFT COMMON FEMORAL PHASIC: NORMAL
BH CV LOWER VASCULAR LEFT COMMON FEMORAL SPONT: NORMAL
BH CV LOWER VASCULAR LEFT DISTAL FEMORAL COMPRESS: NORMAL
BH CV LOWER VASCULAR LEFT GASTRONEMIUS COMPRESS: NORMAL
BH CV LOWER VASCULAR LEFT GREATER SAPH AK COMPRESS: NORMAL
BH CV LOWER VASCULAR LEFT GREATER SAPH BK COMPRESS: NORMAL
BH CV LOWER VASCULAR LEFT LESSER SAPH COMPRESS: NORMAL
BH CV LOWER VASCULAR LEFT MID FEMORAL AUGMENT: NORMAL
BH CV LOWER VASCULAR LEFT MID FEMORAL COMPRESS: NORMAL
BH CV LOWER VASCULAR LEFT MID FEMORAL PHASIC: NORMAL
BH CV LOWER VASCULAR LEFT MID FEMORAL SPONT: NORMAL
BH CV LOWER VASCULAR LEFT PERONEAL COMPRESS: NORMAL
BH CV LOWER VASCULAR LEFT POPLITEAL AUGMENT: NORMAL
BH CV LOWER VASCULAR LEFT POPLITEAL COMPRESS: NORMAL
BH CV LOWER VASCULAR LEFT POPLITEAL PHASIC: NORMAL
BH CV LOWER VASCULAR LEFT POPLITEAL SPONT: NORMAL
BH CV LOWER VASCULAR LEFT POSTERIOR TIBIAL COMPRESS: NORMAL
BH CV LOWER VASCULAR LEFT PROFUNDA FEMORAL COMPRESS: NORMAL
BH CV LOWER VASCULAR LEFT PROXIMAL FEMORAL COMPRESS: NORMAL
BH CV LOWER VASCULAR LEFT SAPHENOFEMORAL JUNCTION COMPRESS: NORMAL
BH CV LOWER VASCULAR RIGHT COMMON FEMORAL AUGMENT: NORMAL
BH CV LOWER VASCULAR RIGHT COMMON FEMORAL COMPRESS: NORMAL
BH CV LOWER VASCULAR RIGHT COMMON FEMORAL PHASIC: NORMAL
BH CV LOWER VASCULAR RIGHT COMMON FEMORAL SPONT: NORMAL
BH CV LOWER VASCULAR RIGHT DISTAL FEMORAL COMPRESS: NORMAL
BH CV LOWER VASCULAR RIGHT GASTRONEMIUS COMPRESS: NORMAL
BH CV LOWER VASCULAR RIGHT GREATER SAPH AK COMPRESS: NORMAL
BH CV LOWER VASCULAR RIGHT GREATER SAPH BK COMPRESS: NORMAL
BH CV LOWER VASCULAR RIGHT LESSER SAPH COMPRESS: NORMAL
BH CV LOWER VASCULAR RIGHT MID FEMORAL AUGMENT: NORMAL
BH CV LOWER VASCULAR RIGHT MID FEMORAL COMPRESS: NORMAL
BH CV LOWER VASCULAR RIGHT MID FEMORAL PHASIC: NORMAL
BH CV LOWER VASCULAR RIGHT MID FEMORAL SPONT: NORMAL
BH CV LOWER VASCULAR RIGHT PERONEAL COMPRESS: NORMAL
BH CV LOWER VASCULAR RIGHT POPLITEAL AUGMENT: NORMAL
BH CV LOWER VASCULAR RIGHT POPLITEAL COMPRESS: NORMAL
BH CV LOWER VASCULAR RIGHT POPLITEAL PHASIC: NORMAL
BH CV LOWER VASCULAR RIGHT POPLITEAL SPONT: NORMAL
BH CV LOWER VASCULAR RIGHT POSTERIOR TIBIAL COMPRESS: NORMAL
BH CV LOWER VASCULAR RIGHT PROFUNDA FEMORAL COMPRESS: NORMAL
BH CV LOWER VASCULAR RIGHT PROXIMAL FEMORAL COMPRESS: NORMAL
BH CV LOWER VASCULAR RIGHT SAPHENOFEMORAL JUNCTION COMPRESS: NORMAL
BILIRUB SERPL-MCNC: 0.3 MG/DL (ref 0–1.2)
BUN SERPL-MCNC: 12 MG/DL (ref 6–20)
BUN/CREAT SERPL: 10.3 (ref 7–25)
CALCIUM SPEC-SCNC: 8.8 MG/DL (ref 8.6–10.5)
CHLORIDE SERPL-SCNC: 104 MMOL/L (ref 98–107)
CO2 SERPL-SCNC: 28 MMOL/L (ref 22–29)
CREAT SERPL-MCNC: 1.16 MG/DL (ref 0.76–1.27)
DEPRECATED RDW RBC AUTO: 45.9 FL (ref 37–54)
ERYTHROCYTE [DISTWIDTH] IN BLOOD BY AUTOMATED COUNT: 14 % (ref 12.3–15.4)
GFR SERPL CREATININE-BSD FRML MDRD: 74 ML/MIN/1.73
GLOBULIN UR ELPH-MCNC: 2.2 GM/DL
GLUCOSE SERPL-MCNC: 102 MG/DL (ref 65–99)
HCT VFR BLD AUTO: 37.9 % (ref 37.5–51)
HGB BLD-MCNC: 12.2 G/DL (ref 13–17.7)
MAXIMAL PREDICTED HEART RATE: 190 BPM
MCH RBC QN AUTO: 28.6 PG (ref 26.6–33)
MCHC RBC AUTO-ENTMCNC: 32.2 G/DL (ref 31.5–35.7)
MCV RBC AUTO: 89 FL (ref 79–97)
PLATELET # BLD AUTO: 211 10*3/MM3 (ref 140–450)
PMV BLD AUTO: 11 FL (ref 6–12)
POTASSIUM SERPL-SCNC: 4.5 MMOL/L (ref 3.5–5.2)
PROT SERPL-MCNC: 6.1 G/DL (ref 6–8.5)
RBC # BLD AUTO: 4.26 10*6/MM3 (ref 4.14–5.8)
SODIUM SERPL-SCNC: 139 MMOL/L (ref 136–145)
STRESS TARGET HR: 162 BPM
WBC NRBC COR # BLD: 8.14 10*3/MM3 (ref 3.4–10.8)

## 2022-01-17 PROCEDURE — 99232 SBSQ HOSP IP/OBS MODERATE 35: CPT | Performed by: INTERNAL MEDICINE

## 2022-01-17 PROCEDURE — 97605 NEG PRS WND THER DME<=50SQCM: CPT

## 2022-01-17 PROCEDURE — 25010000002 HYDROMORPHONE PER 4 MG: Performed by: INTERNAL MEDICINE

## 2022-01-17 PROCEDURE — 99024 POSTOP FOLLOW-UP VISIT: CPT | Performed by: ORTHOPAEDIC SURGERY

## 2022-01-17 PROCEDURE — 25010000002 ENOXAPARIN PER 10 MG: Performed by: ORTHOPAEDIC SURGERY

## 2022-01-17 PROCEDURE — 80053 COMPREHEN METABOLIC PANEL: CPT | Performed by: HOSPITALIST

## 2022-01-17 PROCEDURE — 73721 MRI JNT OF LWR EXTRE W/O DYE: CPT

## 2022-01-17 PROCEDURE — 93970 EXTREMITY STUDY: CPT

## 2022-01-17 PROCEDURE — 85027 COMPLETE CBC AUTOMATED: CPT | Performed by: HOSPITALIST

## 2022-01-17 RX ORDER — FAMOTIDINE 10 MG/ML
20 INJECTION, SOLUTION INTRAVENOUS ONCE
Status: CANCELLED | OUTPATIENT
Start: 2022-01-17 | End: 2022-01-17

## 2022-01-17 RX ORDER — SODIUM CHLORIDE 0.9 % (FLUSH) 0.9 %
10 SYRINGE (ML) INJECTION EVERY 12 HOURS SCHEDULED
Status: CANCELLED | OUTPATIENT
Start: 2022-01-17

## 2022-01-17 RX ORDER — BISACODYL 5 MG/1
5 TABLET, DELAYED RELEASE ORAL DAILY PRN
Status: DISCONTINUED | OUTPATIENT
Start: 2022-01-17 | End: 2022-01-19 | Stop reason: HOSPADM

## 2022-01-17 RX ORDER — SODIUM CHLORIDE, SODIUM LACTATE, POTASSIUM CHLORIDE, CALCIUM CHLORIDE 600; 310; 30; 20 MG/100ML; MG/100ML; MG/100ML; MG/100ML
9 INJECTION, SOLUTION INTRAVENOUS CONTINUOUS
Status: CANCELLED | OUTPATIENT
Start: 2022-01-17

## 2022-01-17 RX ORDER — SODIUM CHLORIDE 9 MG/ML
100 INJECTION, SOLUTION INTRAVENOUS CONTINUOUS
Status: ACTIVE | OUTPATIENT
Start: 2022-01-17 | End: 2022-01-18

## 2022-01-17 RX ORDER — FAMOTIDINE 20 MG/1
20 TABLET, FILM COATED ORAL ONCE
Status: CANCELLED | OUTPATIENT
Start: 2022-01-17 | End: 2022-01-17

## 2022-01-17 RX ORDER — POLYETHYLENE GLYCOL 3350 17 G/17G
17 POWDER, FOR SOLUTION ORAL DAILY PRN
Status: DISCONTINUED | OUTPATIENT
Start: 2022-01-17 | End: 2022-01-19 | Stop reason: HOSPADM

## 2022-01-17 RX ORDER — AMOXICILLIN 250 MG
2 CAPSULE ORAL 2 TIMES DAILY
Status: DISCONTINUED | OUTPATIENT
Start: 2022-01-17 | End: 2022-01-19 | Stop reason: HOSPADM

## 2022-01-17 RX ORDER — LIDOCAINE HYDROCHLORIDE 10 MG/ML
0.5 INJECTION, SOLUTION EPIDURAL; INFILTRATION; INTRACAUDAL; PERINEURAL ONCE AS NEEDED
Status: CANCELLED | OUTPATIENT
Start: 2022-01-17

## 2022-01-17 RX ORDER — SODIUM CHLORIDE 0.9 % (FLUSH) 0.9 %
10 SYRINGE (ML) INJECTION AS NEEDED
Status: CANCELLED | OUTPATIENT
Start: 2022-01-17

## 2022-01-17 RX ORDER — BISACODYL 10 MG
10 SUPPOSITORY, RECTAL RECTAL DAILY PRN
Status: DISCONTINUED | OUTPATIENT
Start: 2022-01-17 | End: 2022-01-19 | Stop reason: HOSPADM

## 2022-01-17 RX ORDER — MIDAZOLAM HYDROCHLORIDE 1 MG/ML
1 INJECTION INTRAMUSCULAR; INTRAVENOUS
Status: CANCELLED | OUTPATIENT
Start: 2022-01-17

## 2022-01-17 RX ADMIN — SODIUM CHLORIDE, PRESERVATIVE FREE 10 ML: 5 INJECTION INTRAVENOUS at 08:40

## 2022-01-17 RX ADMIN — HYDROCODONE BITARTRATE AND ACETAMINOPHEN 1 TABLET: 5; 325 TABLET ORAL at 10:26

## 2022-01-17 RX ADMIN — HYDROCODONE BITARTRATE AND ACETAMINOPHEN 1 TABLET: 5; 325 TABLET ORAL at 21:19

## 2022-01-17 RX ADMIN — HYDROCODONE BITARTRATE AND ACETAMINOPHEN 1 TABLET: 5; 325 TABLET ORAL at 16:04

## 2022-01-17 RX ADMIN — SODIUM CHLORIDE, PRESERVATIVE FREE 10 ML: 5 INJECTION INTRAVENOUS at 21:19

## 2022-01-17 RX ADMIN — HYDROMORPHONE HYDROCHLORIDE 0.5 MG: 1 INJECTION, SOLUTION INTRAMUSCULAR; INTRAVENOUS; SUBCUTANEOUS at 18:16

## 2022-01-17 RX ADMIN — SODIUM CHLORIDE 100 ML/HR: 9 INJECTION, SOLUTION INTRAVENOUS at 12:08

## 2022-01-17 RX ADMIN — HYDROMORPHONE HYDROCHLORIDE 0.5 MG: 1 INJECTION, SOLUTION INTRAMUSCULAR; INTRAVENOUS; SUBCUTANEOUS at 02:45

## 2022-01-17 RX ADMIN — POLYETHYLENE GLYCOL 3350 17 G: 17 POWDER, FOR SOLUTION ORAL at 16:04

## 2022-01-17 RX ADMIN — ENOXAPARIN SODIUM 40 MG: 40 INJECTION SUBCUTANEOUS at 21:19

## 2022-01-17 RX ADMIN — SODIUM CHLORIDE 100 ML/HR: 9 INJECTION, SOLUTION INTRAVENOUS at 01:05

## 2022-01-17 NOTE — PLAN OF CARE
Goal Outcome Evaluation:       VSS, NSR on telemetry, remains on room air and afebrile this shift. C/O pain treated with PRN pain medications. MRI obtained this morning. Wound vac and dressing in place with suction loss at times -  canister snf full with coagulated bloody drainage. Left thigh has increased swelling compared to yesterday. Good pedal pulses and with no discoloration or s/s of restricted blood flow in LLE. NS at 100 mL / hr and good UOP. We will continue with plan of care.

## 2022-01-17 NOTE — PLAN OF CARE
Goal Outcome Evaluation:  Plan of Care Reviewed With: patient        Progress: no change  Outcome Summary: Wound vac intact. Per alarm history and machine assessment, the canister does not appear to be locking appropriately onto the side of the machine. It is currently intact but if the canister is jostled out of place, it will need to be pushed back in to continue therapy. Please call [phone] with any issues between 7:45a and 4:15p

## 2022-01-17 NOTE — CASE MANAGEMENT/SOCIAL WORK
Discharge Planning Assessment  [facility]     Patient Name: Jagdish Kennedy  MRN: [id number]  Today's Date: 1/17/2022    Admit Date: 1/15/2022     Discharge Needs Assessment     Row Name 01/17/22 1400       Living Environment    Lives With significant other; sibling(s)    Name(s) of Who Lives With Patient two brothers and significant other    Current Living Arrangements home/apartment/condo    Primary Care Provided by self    Provides Primary Care For no one    Family Caregiver if Needed significant other    Quality of Family Relationships unable to assess    Able to Return to Prior Arrangements yes       Resource/Environmental Concerns    Resource/Environmental Concerns none       Transition Planning    Patient/Family Anticipates Transition to home    Patient/Family Anticipated Services at Transition none    Transportation Anticipated family or friend will provide       Discharge Needs Assessment    Readmission Within the Last 30 Days no previous admission in last 30 days    Equipment Currently Used at Home none    Concerns to be Addressed no discharge needs identified; denies needs/concerns at this time    Anticipated Changes Related to Illness none    Equipment Needed After Discharge pulse ox    Provided Post Acute Provider List? N/A    N/A Provider List Comment denies    Discharge Coordination/Progress Home               Discharge Plan     Row Name 01/17/22 1402       Plan    Plan Home    Patient/Family in Agreement with Plan yes    Plan Comments Per MDR, patient to go for wash out and closure of faciotomy.  Spoke with patient via telephone regarding discharge planning.  Patient denies use of HH or DME and reports he has prescription coverage.  He lives with two brothers and his girlfriend in a third floor apartment and has not quite figured out how he will get to his apartment at discharge.  He is agreeable to CM making arrangmeent sfor a PCP at discharge.  He will need a Pulse Oximeter.  He received the  COVID vaccine.  CM following.  Patient plan is to discharge home may need rehab or HH depending on hospital course.    Final Discharge Disposition Code 01 - home or self-care              Continued Care and Services - Admitted Since 1/15/2022    Coordination has not been started for this encounter.       Expected Discharge Date and Time     Expected Discharge Date Expected Discharge Time    Jan 21, 2022          Demographic Summary     Row Name 01/17/22 6138       General Information    Admission Type inpatient    Arrived From home    Referral Source admission list    Reason for Consult discharge planning    Preferred Language English     Used During This Interaction no    General Information Comments none       Contact Information    Permission Granted to Share Info With     Contact Information Obtained for     Contact Information Comments Mary Nicolas, mother  [phone]               Functional Status     Row Name 01/17/22 1356       Functional Status    Usual Activity Tolerance excellent    Current Activity Tolerance fair       Functional Status, IADL    Medications independent    Meal Preparation independent    Housekeeping independent    Laundry independent    Shopping independent       Employment/    Employment/ Comments Workers Compensation               Psychosocial    No documentation.                Abuse/Neglect    No documentation.                Legal    No documentation.                Substance Abuse    No documentation.                Patient Forms    No documentation.                   Ann Marie Carrillo RN

## 2022-01-17 NOTE — PROGRESS NOTES
"Orthopaedic Surgery Progress Note     LOS: 2 days   Patient Care Team:  Provider, No Known as PCP - General    POD 2    Subjective     Interval History:   Patient Reports: no new complaints    Objective     Vital Signs:  Temp (24hrs), Av.1 °F (36.2 °C), Min:96.6 °F (35.9 °C), Max:97.7 °F (36.5 °C)    /70 (BP Location: Left arm, Patient Position: Lying)   Pulse 89   Temp 97.4 °F (36.3 °C) (Oral)   Resp 18   Ht 190.5 cm (75\")   Wt 113 kg (250 lb)   SpO2 98%   BMI 31.25 kg/m²     Labs:  Lab Results (last 24 hours)     Procedure Component Value Units Date/Time    Comprehensive Metabolic Panel [[id number]]  (Abnormal) Collected: 22 1542    Specimen: Blood Updated: 22 1659     Glucose 102 mg/dL      BUN 12 mg/dL      Creatinine 1.16 mg/dL      Sodium 139 mmol/L      Potassium 4.5 mmol/L      Comment: Slight hemolysis detected by analyzer. Results may be affected.        Chloride 104 mmol/L      CO2 28.0 mmol/L      Calcium 8.8 mg/dL      Total Protein 6.1 g/dL      Albumin 3.90 g/dL      ALT (SGPT) 14 U/L      AST (SGOT) 18 U/L      Alkaline Phosphatase 53 U/L      Total Bilirubin 0.3 mg/dL      eGFR Non African Amer 74 mL/min/1.73      Globulin 2.2 gm/dL      Comment: Calculated Result        A/G Ratio 1.8 g/dL      BUN/Creatinine Ratio 10.3     Anion Gap 7.0 mmol/L     Narrative:      GFR Normal >60  Chronic Kidney Disease <60  Kidney Failure <15      CBC (No Diff) [[id number]]  (Abnormal) Collected: 22 1201    Specimen: Blood Updated: 22 1227     WBC 8.14 10*3/mm3      RBC 4.26 10*6/mm3      Hemoglobin 12.2 g/dL      Hematocrit 37.9 %      MCV 89.0 fL      MCH 28.6 pg      MCHC 32.2 g/dL      RDW 14.0 %      RDW-SD 45.9 fl      MPV 11.0 fL      Platelets 211 10*3/mm3           Imaging:  MRI Knee Left Without Contrast  Narrative: EXAMINATION: MRI KNEE LEFT  WO CONTRAST- 2022     INDICATION: Knee instability      TECHNIQUE: Multiplanar MRI of the knee without intravenous " contrast     COMPARISON: Knee x-ray 01/15/2022     FINDINGS: Extensor mechanism intact with quadriceps and patellar tendons  in normal limits. Subcutaneous edema throughout including prepatellar  edema.     Patellofemoral joint space reveals no evidence for high-grade  chondromalacia or disruption of the patellar retinaculum with small  suprapatellar effusion however no evidence for loose body.     Femorotibial joint spaces reveal preserved distal femoral and proximal  tibial plateau cortical margins. ACL and PCL intact. Lateral meniscus  intact and medial meniscus intact. MCL intact and unremarkable. Lateral  ligamentous complex reveals lateral collateral intact and fibular  collateral intact with questionable increased signal involving the  distal popliteal tendon likely adjacent or relative fluid signal from  adjacent subcutaneous edema without irregularity or disruption.     Impression: Extensive subcutaneous soft tissue edema throughout the  visualized soft tissues and field-of-view including prepatellar edema.  Trace suprapatellar edema may be secondary to the subcutaneous  involvement otherwise no internal derangement.      D:  01/17/2022  E:  01/17/2022     This report was finalized on 1/17/2022 9:39 AM by Dr. Hai Escalante.          Physical Exam:  Leg leg, compartments soft, pulses intact    Assessment/Plan   POD 2 s/p left leg fasciotomy  To OR for wound closure tomorrow    Chance Zhu MD  01/17/22  18:19 EST

## 2022-01-17 NOTE — PROGRESS NOTES
[facility] Medicine Services  PROGRESS NOTE    Patient Name: Jagdish Kennedy  : 1990  MRN: [id number]    Date of Admission: 1/15/2022  Primary Care Physician: Provider, No Known    Subjective   Subjective     CC:  Leg pain     HPI:  No acute events. Pain with fair control. No BM.     ROS:  Gen- No fevers, chills  CV- No chest pain, palpitations  Resp- No cough, dyspnea  GI- No N/V/D, abd pain     Objective   Objective     Vital Signs:   Temp:  [96.6 °F (35.9 °C)-97.5 °F (36.4 °C)] 97.2 °F (36.2 °C)  Heart Rate:  [55-93] 55  Resp:  [16-18] 18  BP: ()/(56-80) 97/60     Physical Exam:  Constitutional: No acute distress, awake, alert  HENT: NCAT, mucous membranes moist  Respiratory: Clear to auscultation bilaterally, respiratory effort normal   Cardiovascular: RRR, no murmurs, rubs, or gallops  Gastrointestinal: Positive bowel sounds, soft, nontender, nondistended  Musculoskeletal: left leg with wound vac in place; swelling up knee/thigh   Psychiatric: Appropriate affect, cooperative  Neurologic: Oriented x 3, strength symmetric in all extremities, Cranial Nerves grossly intact to confrontation, speech clear  Skin: No rashes    Results Reviewed:  LAB RESULTS:      Lab 01/15/22  1245   WBC 12.86*   HEMOGLOBIN 15.0   HEMATOCRIT 45.6   PLATELETS 269   NEUTROS ABS 10.62*   IMMATURE GRANS (ABS) 0.05   LYMPHS ABS 1.39   MONOS ABS 0.71   EOS ABS 0.04   MCV 86.4   LACTATE 1.7         Lab 01/15/22  1245   SODIUM 139   POTASSIUM 4.2   CHLORIDE 103   CO2 26.0   ANION GAP 10.0   BUN 14   CREATININE 0.98   GLUCOSE 103*   CALCIUM 9.8         Lab 01/15/22  1245   TOTAL PROTEIN 7.8   ALBUMIN 5.20   GLOBULIN 2.6   ALT (SGPT) 21   AST (SGOT) 20   BILIRUBIN 0.6   ALK PHOS 63                 Lab 01/15/22  1736   ABO TYPING O   RH TYPING Positive   ANTIBODY SCREEN Negative         Brief Urine Lab Results     None          Microbiology Results Abnormal     None          XR Knee 1 or 2 View  Left    Result Date: 1/15/2022  EXAMINATION: XR TIBIA FIBULA 2 VW LEFT-, XR FOOT 3+ VW LEFT-, XR KNEE 1 OR 2 VW LEFT-, XR ANKLE 3+ VW LEFT-  INDICATION: injury  COMPARISON: NONE  FINDINGS: There is extensive subcutaneous edema and soft tissue reticulation noted along the medial aspect of the proximal tibia/knee. Cortical margins are otherwise intact, without evidence of acute fracture. Small suprapatellar joint effusion is present.  The ankle mortise is symmetric with an intact talar dome. No fracture or dislocation. Cortical margins of the left foot are maintained without evidence of fracture, dislocation or suspicious osseous lesion. There is no evidence of subluxation or dislocation.      Impression: Fairly extensive superficial soft tissue edema is noted along the medial aspect of the left knee and proximal tibia, with small suprapatellar joint effusion also noted. The remainder of the imaged left lower extremity otherwise demonstrates no evidence of acute osseous or articular abnormality.  This report was finalized on 1/15/2022 12:46 PM by Yordan Melendez.      XR Tibia Fibula 2 View Left    Result Date: 1/15/2022  EXAMINATION: XR TIBIA FIBULA 2 VW LEFT-, XR FOOT 3+ VW LEFT-, XR KNEE 1 OR 2 VW LEFT-, XR ANKLE 3+ VW LEFT-  INDICATION: injury  COMPARISON: NONE  FINDINGS: There is extensive subcutaneous edema and soft tissue reticulation noted along the medial aspect of the proximal tibia/knee. Cortical margins are otherwise intact, without evidence of acute fracture. Small suprapatellar joint effusion is present.  The ankle mortise is symmetric with an intact talar dome. No fracture or dislocation. Cortical margins of the left foot are maintained without evidence of fracture, dislocation or suspicious osseous lesion. There is no evidence of subluxation or dislocation.      Impression: Fairly extensive superficial soft tissue edema is noted along the medial aspect of the left knee and proximal tibia, with small  suprapatellar joint effusion also noted. The remainder of the imaged left lower extremity otherwise demonstrates no evidence of acute osseous or articular abnormality.  This report was finalized on 1/15/2022 12:46 PM by Yordan Melendez.      XR Ankle 3+ View Left    Result Date: 1/15/2022  EXAMINATION: XR TIBIA FIBULA 2 VW LEFT-, XR FOOT 3+ VW LEFT-, XR KNEE 1 OR 2 VW LEFT-, XR ANKLE 3+ VW LEFT-  INDICATION: injury  COMPARISON: NONE  FINDINGS: There is extensive subcutaneous edema and soft tissue reticulation noted along the medial aspect of the proximal tibia/knee. Cortical margins are otherwise intact, without evidence of acute fracture. Small suprapatellar joint effusion is present.  The ankle mortise is symmetric with an intact talar dome. No fracture or dislocation. Cortical margins of the left foot are maintained without evidence of fracture, dislocation or suspicious osseous lesion. There is no evidence of subluxation or dislocation.      Impression: Fairly extensive superficial soft tissue edema is noted along the medial aspect of the left knee and proximal tibia, with small suprapatellar joint effusion also noted. The remainder of the imaged left lower extremity otherwise demonstrates no evidence of acute osseous or articular abnormality.  This report was finalized on 1/15/2022 12:46 PM by Yordan Melendez.      XR Foot 3+ View Left    Result Date: 1/15/2022  EXAMINATION: XR TIBIA FIBULA 2 VW LEFT-, XR FOOT 3+ VW LEFT-, XR KNEE 1 OR 2 VW LEFT-, XR ANKLE 3+ VW LEFT-  INDICATION: injury  COMPARISON: NONE  FINDINGS: There is extensive subcutaneous edema and soft tissue reticulation noted along the medial aspect of the proximal tibia/knee. Cortical margins are otherwise intact, without evidence of acute fracture. Small suprapatellar joint effusion is present.  The ankle mortise is symmetric with an intact talar dome. No fracture or dislocation. Cortical margins of the left foot are maintained without evidence  of fracture, dislocation or suspicious osseous lesion. There is no evidence of subluxation or dislocation.      Impression: Fairly extensive superficial soft tissue edema is noted along the medial aspect of the left knee and proximal tibia, with small suprapatellar joint effusion also noted. The remainder of the imaged left lower extremity otherwise demonstrates no evidence of acute osseous or articular abnormality.  This report was finalized on 1/15/2022 12:46 PM by Yordan Melendez.      MRI Knee Left Without Contrast    Result Date: 1/17/2022  EXAMINATION: MRI KNEE LEFT  WO CONTRAST- 01/17/2022  INDICATION: Knee instability  TECHNIQUE: Multiplanar MRI of the knee without intravenous contrast  COMPARISON: Knee x-ray 01/15/2022  FINDINGS: Extensor mechanism intact with quadriceps and patellar tendons in normal limits. Subcutaneous edema throughout including prepatellar edema.  Patellofemoral joint space reveals no evidence for high-grade chondromalacia or disruption of the patellar retinaculum with small suprapatellar effusion however no evidence for loose body.  Femorotibial joint spaces reveal preserved distal femoral and proximal tibial plateau cortical margins. ACL and PCL intact. Lateral meniscus intact and medial meniscus intact. MCL intact and unremarkable. Lateral ligamentous complex reveals lateral collateral intact and fibular collateral intact with questionable increased signal involving the distal popliteal tendon likely adjacent or relative fluid signal from adjacent subcutaneous edema without irregularity or disruption.      Impression: Extensive subcutaneous soft tissue edema throughout the visualized soft tissues and field-of-view including prepatellar edema. Trace suprapatellar edema may be secondary to the subcutaneous involvement otherwise no internal derangement.   D:  01/17/2022 E:  01/17/2022            I have reviewed the medications:  Scheduled Meds:enoxaparin, 40 mg, Subcutaneous,  Q24H  sodium chloride, 10 mL, Intravenous, Q12H      Continuous Infusions:sodium chloride, 100 mL/hr, Last Rate: 100 mL/hr (01/17/22 0105)      PRN Meds:.•  acetaminophen **OR** acetaminophen **OR** acetaminophen  •  HYDROcodone-acetaminophen  •  HYDROmorphone **AND** naloxone  •  ondansetron **OR** ondansetron  •  sodium chloride    Assessment/Plan   Assessment & Plan     Active Hospital Problems    Diagnosis  POA   • **Traumatic compartment syndrome of left lower extremity (HCC) [T79.A22A]  Yes   • Lab test positive for detection of COVID-19 virus [U07.1]  Yes      Resolved Hospital Problems   No resolved problems to display.        Brief Hospital Course to date:  Jagdish Kennedy is a 31 y.o. male fully vaccinated for COVID-19 who presented after traumatic injury to his left leg at work with subsequent pain and increasing swelling. He was admitted for concern of compartment syndrome; he was incidentally and asymptomatically COVID-19 positive in the ED. Patient was taken to the OR 1/15 by Dr. Zhu and fasciotomy performed.     Assessment/plan     Traumatic injury to the left leg  Compartment syndrome of the left calf/leg  - s/p fasciotomy per Dr. Zhu to the LLE (1/15)  - Wound vac in place and PT are following   - PT/OT - weight bearing as tolerated   - Wound care  - Cefazolin 2gm IV x 24 hrs post op per ortho surgery  - plan to return to OR Tuesday for wound washout and closure of fasciotomy  - Oral and IV pain medicines ordered  -  DVT Ppx - lovenox   - rehab recs      Asymptomatic COVID-19 positive lab test  - Fully vaccinated for the same  - Repeat COVID test positive.  - Does not qualify for antiviral- asymptomatic at this time and on RA    DVT prophylaxis:  Medical and mechanical DVT prophylaxis orders are present.       AM-PAC 6 Clicks Score (PT): 18 (01/16/22 4938)    Disposition: I expect the patient to be discharged TBD    CODE STATUS:   Code Status and Medical Interventions:   Ordered at: 01/15/22  1516     Code Status (Patient has no pulse and is not breathing):    CPR (Attempt to Resuscitate)     Medical Interventions (Patient has pulse or is breathing):    Full Support       Tiffany Mccain DO  01/17/22

## 2022-01-17 NOTE — THERAPY WOUND CARE TREATMENT
Acute Care - Wound/Debridement Treatment Note  [facility]     Patient Name: Jagdish Kennedy  : 1990  MRN: [id number]  Today's Date: 2022                Admit Date: 1/15/2022    Visit Dx:    ICD-10-CM ICD-9-CM   1. Traumatic compartment syndrome of left lower extremity, initial encounter ([facility])  T79.A22A 958.92   2. COVID-19  U07.1 079.89   3. Leukocytosis, unspecified type  D72.829 288.60   4. Elevated CK  R74.8 790.5       Patient Active Problem List   Diagnosis   • Traumatic compartment syndrome of left lower extremity (HCC)   • Lab test positive for detection of COVID-19 virus        History reviewed. No pertinent past medical history.     Past Surgical History:   Procedure Laterality Date   • TONSILLECTOMY             Wound 01/15/22 Left lower leg Incision (Active)   Dressing Appearance intact; no drainage 22 08   Closure MYRTLE 22 0840   Base dressing in place, unable to visualize 22 0840   Periwound Care dry periwound area maintained 22 0840   Wound Output (mL) 200 22 0805         WOUND DEBRIDEMENT                     PT Assessment (last 12 hours)     PT Evaluation and Treatment     Row Name 22 0805          Physical Therapy Time and Intention    Subjective Information no complaints  pt asleep, did not wake him for tx  -     Document Type wound care; therapy note (daily note)  -     Mode of Treatment physical therapy; individual therapy  -     Row Name 22 0805          General Information    Patient Observations cooperative  -     Row Name 22 0805          Cognition    Orientation Status (Cognition) other (see comments)  pt asleep, did not assess today  -     Row Name 22 0805          Pain    Additional Documentation Pain Scale: FACES Pre/Post-Treatment (Group)  -     Row Name 22 0805          Pain Scale: FACES Pre/Post-Treatment    Pain: FACES Scale, Pretreatment 0-->no hurt  -     Posttreatment Pain Rating 0-->no hurt   -     Row Name 01/17/22 0805          Wound 01/15/22 Left lower leg Incision    Wound - Properties Group Placement Date: 01/15/22  - Present on Hospital Admission: N  -SH Side: Left  -SH Orientation: lower  -SH Location: leg  -SH Primary Wound Type: Incision  -SH     Dressing Appearance intact; no drainage  -     Base dressing in place, unable to visualize  -     Wound Output (mL) 200  -     Retired Wound - Properties Group Date first assessed: 01/15/22  - Present on Hospital Admission: N  -SH Side: Left  -SH Location: leg  -SH Primary Wound Type: Incision  -SH     Row Name 01/17/22 0805          Coping    Observed Emotional State calm  -     Row Name 01/17/22 0805          Plan of Care Review    Plan of Care Reviewed With patient  -     Progress no change  -     Outcome Summary Wound vac intact. Per alarm history and machine assessment, the canister does not appear to be locking appropriately onto the side of the machine. It is currently intact but if the canister is jostled out of place, it will need to be pushed back in to continue therapy. Please call [phone] with any issues between 7:45a and 4:15p  -     Row Name 01/17/22 0805          Positioning and Restraints    Pre-Treatment Position in bed  -     Post Treatment Position bed  -     In Bed supine; call light within reach; encouraged to call for assist  -           User Key  (r) = Recorded By, (t) = Taken By, (c) = Cosigned By    Initials Name Provider Type     Haase, Sherri L, RN Registered Nurse    Amy Herrera PT Physical Therapist              Physical Therapy Education                 Title: PT OT SLP Therapies (In Progress)     Topic: Physical Therapy (Done)     Point: Mobility training (Done)     Learning Progress Summary           Patient MATTHIAS Ball D, VU by MB at 1/16/2022 1020                   Point: Home exercise program (Done)     Learning Progress Summary           Patient MATTHIAS Ball D, NESS by MB at 1/16/2022 1020                    Point: Body mechanics (Done)     Learning Progress Summary           Patient Eager, E,D, VU by MB at 1/16/2022 1020                   Point: Precautions (Done)     Learning Progress Summary           Patient Eager, E,D, VU by MB at 1/16/2022 1020                               User Key     Initials Effective Dates Name Provider Type Discipline    MB 06/16/21 -  Geri Mejia, PT Physical Therapist PT                Recommendation and Plan  Anticipated Discharge Disposition (PT): inpatient rehabilitation facility  Planned Therapy Interventions (PT): wound care, patient/family education  Therapy Frequency (PT): daily  Plan of Care Reviewed With: patient   Progress: no change       Progress: no change  Outcome Summary: Wound vac intact. Per alarm history and machine assessment, the canister does not appear to be locking appropriately onto the side of the machine. It is currently intact but if the canister is jostled out of place, it will need to be pushed back in to continue therapy. Please call [phone] with any issues between 7:45a and 4:15p  Plan of Care Reviewed With: patient            Time Calculation   PT Charges     Row Name 01/17/22 0805             Time Calculation    Start Time 0805  -MC      PT Goal Re-Cert Due Date 01/26/22  -MC              Untimed Charges    17245-Ijb Pressure wound to 50 sqcm 10  -MC              Total Minutes    Untimed Charges Total Minutes 10  -MC       Total Minutes 10  -MC            User Key  (r) = Recorded By, (t) = Taken By, (c) = Cosigned By    Initials Name Provider Type     Amy Rader, PT Physical Therapist                  Therapy Charges for Today     Code Description Service Date Service Provider Modifiers Qty    63379934552 HC PT NEG PRESS WOUND TO 50SQCM DME3 1/16/2022 Amy Rader, PT GP 1    82294784717 HC PT NEG PRESS WOUND TO 50SQCM DME1 1/17/2022 Amy Rader, PT  1            PT G-Codes  Outcome Measure Options: AM-PAC 6  Clicks Basic Mobility (PT)  AM-PAC 6 Clicks Score (PT): 18  AM-PAC 6 Clicks Score (OT): 19       Amy Rader, PT  1/17/2022
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
## 2022-01-18 ENCOUNTER — ANESTHESIA (OUTPATIENT)
Dept: PERIOP | Facility: HOSPITAL | Age: 32
End: 2022-01-18

## 2022-01-18 LAB
ANION GAP SERPL CALCULATED.3IONS-SCNC: 7 MMOL/L (ref 5–15)
BUN SERPL-MCNC: 13 MG/DL (ref 6–20)
BUN/CREAT SERPL: 16.3 (ref 7–25)
CALCIUM SPEC-SCNC: 9.2 MG/DL (ref 8.6–10.5)
CHLORIDE SERPL-SCNC: 104 MMOL/L (ref 98–107)
CK SERPL-CCNC: 155 U/L (ref 20–200)
CO2 SERPL-SCNC: 27 MMOL/L (ref 22–29)
CREAT SERPL-MCNC: 0.8 MG/DL (ref 0.76–1.27)
DEPRECATED RDW RBC AUTO: 45 FL (ref 37–54)
ERYTHROCYTE [DISTWIDTH] IN BLOOD BY AUTOMATED COUNT: 13.9 % (ref 12.3–15.4)
GFR SERPL CREATININE-BSD FRML MDRD: 114 ML/MIN/1.73
GLUCOSE SERPL-MCNC: 93 MG/DL (ref 65–99)
HCT VFR BLD AUTO: 37.9 % (ref 37.5–51)
HGB BLD-MCNC: 12.2 G/DL (ref 13–17.7)
MCH RBC QN AUTO: 28.5 PG (ref 26.6–33)
MCHC RBC AUTO-ENTMCNC: 32.2 G/DL (ref 31.5–35.7)
MCV RBC AUTO: 88.6 FL (ref 79–97)
MYOGLOBIN SERPL-MCNC: 23.9 NG/ML (ref 28–72)
PLATELET # BLD AUTO: 205 10*3/MM3 (ref 140–450)
PMV BLD AUTO: 11.1 FL (ref 6–12)
POTASSIUM SERPL-SCNC: 4.4 MMOL/L (ref 3.5–5.2)
RBC # BLD AUTO: 4.28 10*6/MM3 (ref 4.14–5.8)
SODIUM SERPL-SCNC: 138 MMOL/L (ref 136–145)
WBC NRBC COR # BLD: 6.42 10*3/MM3 (ref 3.4–10.8)

## 2022-01-18 PROCEDURE — 83874 ASSAY OF MYOGLOBIN: CPT | Performed by: INTERNAL MEDICINE

## 2022-01-18 PROCEDURE — 25010000002 FENTANYL CITRATE (PF) 50 MCG/ML SOLUTION: Performed by: NURSE ANESTHETIST, CERTIFIED REGISTERED

## 2022-01-18 PROCEDURE — 80048 BASIC METABOLIC PNL TOTAL CA: CPT | Performed by: INTERNAL MEDICINE

## 2022-01-18 PROCEDURE — 25010000002 ENOXAPARIN PER 10 MG: Performed by: ORTHOPAEDIC SURGERY

## 2022-01-18 PROCEDURE — 25010000002 ONDANSETRON PER 1 MG: Performed by: NURSE ANESTHETIST, CERTIFIED REGISTERED

## 2022-01-18 PROCEDURE — 25010000002 HYDROMORPHONE PER 4 MG: Performed by: INTERNAL MEDICINE

## 2022-01-18 PROCEDURE — 25010000002 KETOROLAC TROMETHAMINE PER 15 MG: Performed by: NURSE ANESTHETIST, CERTIFIED REGISTERED

## 2022-01-18 PROCEDURE — 0 LIDOCAINE 1 % SOLUTION: Performed by: NURSE ANESTHETIST, CERTIFIED REGISTERED

## 2022-01-18 PROCEDURE — 0 CEFAZOLIN IN DEXTROSE 2-4 GM/100ML-% SOLUTION: Performed by: NURSE ANESTHETIST, CERTIFIED REGISTERED

## 2022-01-18 PROCEDURE — 25010000002 PROPOFOL 10 MG/ML EMULSION: Performed by: NURSE ANESTHETIST, CERTIFIED REGISTERED

## 2022-01-18 PROCEDURE — 0JQP0ZZ REPAIR LEFT LOWER LEG SUBCUTANEOUS TISSUE AND FASCIA, OPEN APPROACH: ICD-10-PCS | Performed by: ORTHOPAEDIC SURGERY

## 2022-01-18 PROCEDURE — 82550 ASSAY OF CK (CPK): CPT | Performed by: INTERNAL MEDICINE

## 2022-01-18 PROCEDURE — 97110 THERAPEUTIC EXERCISES: CPT

## 2022-01-18 PROCEDURE — C1889 IMPLANT/INSERT DEVICE, NOC: HCPCS | Performed by: ORTHOPAEDIC SURGERY

## 2022-01-18 PROCEDURE — 99232 SBSQ HOSP IP/OBS MODERATE 35: CPT | Performed by: INTERNAL MEDICINE

## 2022-01-18 PROCEDURE — 13160 SEC CLSR SURG WND/DEHSN XTN: CPT | Performed by: ORTHOPAEDIC SURGERY

## 2022-01-18 PROCEDURE — 85027 COMPLETE CBC AUTOMATED: CPT | Performed by: INTERNAL MEDICINE

## 2022-01-18 DEVICE — DEV CONTRL TISS STRATAFIX SPIRAL MNCRYL UD 3/0 PLS 60CM: Type: IMPLANTABLE DEVICE | Site: LEG | Status: FUNCTIONAL

## 2022-01-18 RX ORDER — EPHEDRINE SULFATE 50 MG/ML
5 INJECTION, SOLUTION INTRAVENOUS ONCE AS NEEDED
Status: DISCONTINUED | OUTPATIENT
Start: 2022-01-18 | End: 2022-01-18 | Stop reason: HOSPADM

## 2022-01-18 RX ORDER — OXYCODONE HYDROCHLORIDE AND ACETAMINOPHEN 5; 325 MG/1; MG/1
1 TABLET ORAL EVERY 4 HOURS PRN
Status: DISCONTINUED | OUTPATIENT
Start: 2022-01-18 | End: 2022-01-19 | Stop reason: HOSPADM

## 2022-01-18 RX ORDER — LIDOCAINE HYDROCHLORIDE 10 MG/ML
INJECTION, SOLUTION INFILTRATION; PERINEURAL AS NEEDED
Status: DISCONTINUED | OUTPATIENT
Start: 2022-01-18 | End: 2022-01-18 | Stop reason: SURG

## 2022-01-18 RX ORDER — CEFAZOLIN SODIUM 2 G/100ML
INJECTION, SOLUTION INTRAVENOUS AS NEEDED
Status: DISCONTINUED | OUTPATIENT
Start: 2022-01-18 | End: 2022-01-18 | Stop reason: SURG

## 2022-01-18 RX ORDER — PROMETHAZINE HYDROCHLORIDE 25 MG/1
25 SUPPOSITORY RECTAL ONCE AS NEEDED
Status: DISCONTINUED | OUTPATIENT
Start: 2022-01-18 | End: 2022-01-18 | Stop reason: HOSPADM

## 2022-01-18 RX ORDER — PROMETHAZINE HYDROCHLORIDE 25 MG/1
25 TABLET ORAL ONCE AS NEEDED
Status: DISCONTINUED | OUTPATIENT
Start: 2022-01-18 | End: 2022-01-18 | Stop reason: HOSPADM

## 2022-01-18 RX ORDER — PROPOFOL 10 MG/ML
VIAL (ML) INTRAVENOUS AS NEEDED
Status: DISCONTINUED | OUTPATIENT
Start: 2022-01-18 | End: 2022-01-18 | Stop reason: SURG

## 2022-01-18 RX ORDER — MAGNESIUM HYDROXIDE 1200 MG/15ML
LIQUID ORAL AS NEEDED
Status: DISCONTINUED | OUTPATIENT
Start: 2022-01-18 | End: 2022-01-18 | Stop reason: HOSPADM

## 2022-01-18 RX ORDER — FENTANYL CITRATE 50 UG/ML
INJECTION, SOLUTION INTRAMUSCULAR; INTRAVENOUS AS NEEDED
Status: DISCONTINUED | OUTPATIENT
Start: 2022-01-18 | End: 2022-01-18 | Stop reason: SURG

## 2022-01-18 RX ORDER — FENTANYL CITRATE 50 UG/ML
50 INJECTION, SOLUTION INTRAMUSCULAR; INTRAVENOUS
Status: DISCONTINUED | OUTPATIENT
Start: 2022-01-18 | End: 2022-01-18 | Stop reason: HOSPADM

## 2022-01-18 RX ORDER — SODIUM CHLORIDE, SODIUM LACTATE, POTASSIUM CHLORIDE, CALCIUM CHLORIDE 600; 310; 30; 20 MG/100ML; MG/100ML; MG/100ML; MG/100ML
INJECTION, SOLUTION INTRAVENOUS CONTINUOUS PRN
Status: DISCONTINUED | OUTPATIENT
Start: 2022-01-18 | End: 2022-01-18 | Stop reason: SURG

## 2022-01-18 RX ORDER — ONDANSETRON 2 MG/ML
INJECTION INTRAMUSCULAR; INTRAVENOUS AS NEEDED
Status: DISCONTINUED | OUTPATIENT
Start: 2022-01-18 | End: 2022-01-18 | Stop reason: SURG

## 2022-01-18 RX ORDER — KETOROLAC TROMETHAMINE 30 MG/ML
INJECTION, SOLUTION INTRAMUSCULAR; INTRAVENOUS AS NEEDED
Status: DISCONTINUED | OUTPATIENT
Start: 2022-01-18 | End: 2022-01-18 | Stop reason: SURG

## 2022-01-18 RX ORDER — EPHEDRINE SULFATE 50 MG/ML
INJECTION, SOLUTION INTRAVENOUS AS NEEDED
Status: DISCONTINUED | OUTPATIENT
Start: 2022-01-18 | End: 2022-01-18 | Stop reason: SURG

## 2022-01-18 RX ADMIN — ONDANSETRON 4 MG: 2 INJECTION INTRAMUSCULAR; INTRAVENOUS at 12:46

## 2022-01-18 RX ADMIN — SODIUM CHLORIDE, PRESERVATIVE FREE 10 ML: 5 INJECTION INTRAVENOUS at 20:07

## 2022-01-18 RX ADMIN — SODIUM CHLORIDE, PRESERVATIVE FREE 10 ML: 5 INJECTION INTRAVENOUS at 08:27

## 2022-01-18 RX ADMIN — PROPOFOL 200 MG: 10 INJECTION, EMULSION INTRAVENOUS at 12:31

## 2022-01-18 RX ADMIN — ENOXAPARIN SODIUM 40 MG: 40 INJECTION SUBCUTANEOUS at 20:06

## 2022-01-18 RX ADMIN — HYDROMORPHONE HYDROCHLORIDE 0.5 MG: 1 INJECTION, SOLUTION INTRAMUSCULAR; INTRAVENOUS; SUBCUTANEOUS at 11:45

## 2022-01-18 RX ADMIN — KETOROLAC TROMETHAMINE 30 MG: 30 INJECTION, SOLUTION INTRAMUSCULAR; INTRAVENOUS at 13:16

## 2022-01-18 RX ADMIN — FENTANYL CITRATE 100 MCG: 50 INJECTION, SOLUTION INTRAMUSCULAR; INTRAVENOUS at 12:31

## 2022-01-18 RX ADMIN — LIDOCAINE HYDROCHLORIDE 50 MG: 10 INJECTION, SOLUTION INFILTRATION; PERINEURAL at 12:31

## 2022-01-18 RX ADMIN — SENNOSIDES AND DOCUSATE SODIUM 2 TABLET: 50; 8.6 TABLET ORAL at 20:07

## 2022-01-18 RX ADMIN — EPHEDRINE SULFATE 10 MG: 50 INJECTION INTRAVENOUS at 12:52

## 2022-01-18 RX ADMIN — SODIUM CHLORIDE, POTASSIUM CHLORIDE, SODIUM LACTATE AND CALCIUM CHLORIDE: 600; 310; 30; 20 INJECTION, SOLUTION INTRAVENOUS at 12:26

## 2022-01-18 RX ADMIN — CEFAZOLIN SODIUM 2 G: 2 INJECTION, SOLUTION INTRAVENOUS at 12:31

## 2022-01-18 RX ADMIN — HYDROMORPHONE HYDROCHLORIDE 0.5 MG: 1 INJECTION, SOLUTION INTRAMUSCULAR; INTRAVENOUS; SUBCUTANEOUS at 04:32

## 2022-01-18 NOTE — ANESTHESIA POSTPROCEDURE EVALUATION
Patient: Jagdish Kennedy    Procedure Summary     Date: 01/18/22 Room / Location:  [facility]    Anesthesia Start: 1226 Anesthesia Stop: 1331    Procedure: WOUND CLOSURE ORTHOPEDIC LEFT LOWER LEG (Left ) Diagnosis:       Traumatic compartment syndrome of left lower extremity, initial encounter ([facility])      (Traumatic compartment syndrome of left lower extremity, initial encounter ([facility]) [T79.A22A])    Surgeons: Chance Zhu MD Provider: Selwyn Mayo MD    Anesthesia Type: general ASA Status: 2          Anesthesia Type: general    Vitals  BP: 126/76  HR: 83  Sao2: 98%  RR: 16        Post Anesthesia Care and Evaluation    Patient location during evaluation: floor  Patient participation: complete - patient participated  Level of consciousness: awake and alert  Pain score: 4  Pain management: adequate  Airway patency: patent  Anesthetic complications: No anesthetic complications  PONV Status: none  Cardiovascular status: hemodynamically stable and acceptable  Respiratory status: nonlabored ventilation, acceptable and nasal cannula  Hydration status: acceptable    Comments: Patient awake and alert, hemostable        Progress Notes by Mervin Cabrera DO at 05/12/17 01:30 PM     Author:  Mervin Cabrera DO Service:  (none) Author Type:  Physician     Filed:  05/12/17 01:32 PM Encounter Date:  5/12/2017 Status:  Signed     :  Mervin Cabrera DO (Physician)            SUBJECTIVE:    Jennifer Gil is a 84 year old female who presents to Immediate Care with respiratory symptoms which have been present for[BH1.1T] several weeks[BH1.1M]. Symptoms include:[BH1.1T] cough described as congested, fatigue, headache (mild), nausea and voice changes[BH1.1M]. Denies:[BH1.1T] abdominal pain, appetite loss, body aches, diarrhea, ear ache, shortness of breath, sneezing, sore throat, sweats, tooth pain, vomiting or wheezing[BH1.1M].     Improving factors:[BH1.1T] None[BH1.1M].  Worsening factors:[BH1.1T] None[BH1.1M].    The remainder of the ROS is negative.    Patient Active Problem List    Diagnosis    • CHR AIRWAY OBSTRUCT NEC   • Essential hypertension, benign   • Macular puckering of retina   • Colon polyps   • Osteopenia   • Chronic renal insufficiency, stage III (moderate)   • Allergic rhinitis   • Osteoarthritis   • Insomnia   • GERD (gastroesophageal reflux disease)   • Family history of coronary artery disease   • Depression   • Anxiety   • Alcohol abuse   • RLS (restless legs syndrome)   • Carpal tunnel syndrome   • Osteoporosis   • Presbyopia   • Regular astigmatism   • Myopia   • Primary osteoarthritis of both knees   • Dyslipidemia   • Status post total left knee replacement   • Weakness of left lower extremity   • Decreased range of motion of left knee   • Difficulty walking   • Chronic cough   • Status post total right knee replacement   • Pain   • Constipation   • Anemia   • Essential hypertension   • Primary osteoarthritis of right knee   • Acute pain of right knee   • Swelling of right knee joint   • Acute pain of right knee   • H/O rotator cuff tear   • Rotator cuff syndrome     SH:[BH1.1T] Denies tobacco  use/smoking.[BH1.1M]  Allergies: Lisinopril    OBJECTIVE:    Filed Vitals:     05/12/17 1307   BP: 132/70   Pulse: 77   Resp: 14   Temp: 98.3 °F (36.8 °C)   TempSrc: Tympanic   SpO2: 97%      General appearance:[BH1.1T] Alert, well hydrated, well nourished in no apparent distress[BH1.1M]  Ear Exam: TM's intact without erythema, bulging, retraction, or fluid level - external auditory canal clear  Conjunctiva: Clear without injection or discharge; lids clear  Nose:[BH1.1T] Nasal mucosa moist, pink without rhinorrhea or sinus tenderness[BH1.1M]  Throat:[BH1.1T] erythematous and moist but without edema or exudates[BH1.1M]  Neck: supple without cervical lymphadenopathy.  No meningismus.  Heart:[BH1.1T] regular rate and rhythm without S3, S4 or murmur[BH1.1M]  Lungs:[BH1.1T] clear to auscultation without wheezes, rhonchi or rales[BH1.1M];[BH1.1T] normal respiratory effort  Skin: smooth without rash or edema, capillary refill is normal and turgur is normal[BH1.1M]    ASSESSMENT/PLAN:    BRONCHITIS (acute - possibly bacterial) - Discussed treatment/medication options and risks and benefits and encouraged to take course of antibiotics as ordered.  May use tylenol as directed prn pain or fever.  Instructed to call with any questions regarding medication usage. Advised to return to Immediate Care or follow-up with primary care provider for reevaluation if symptoms worsen or are not improving in 5-7 days.      Diagnosis and prognosis, treatment and side-effects were explained and all questions were answered satisfactorily and there were no further questions upon discharge    Electronically Signed by:    Mervin Cabrera DO , 5/12/2017[BH1.1T]       Revision History        User Key Date/Time User Provider Type Action    > BH1.1 05/12/17 01:32 PM Mervin Cabrera DO Physician Sign    M - Manual, T - Template

## 2022-01-18 NOTE — PAYOR COMM NOTE
"RUEL FINN, RN  UTILIZATION REVIEW  P:  [phone]  F:  [phone]      Notification of INPT admission on 1/15/22.  Clinicals attached.          Jazzmine Brown (31 y.o. Male)             Date of Birth Social Security Number Address Home Phone MRN    1990  [address]  [place] [address] [phone] [id number]    Protestant Marital Status             None Single       Admission Date Admission Type Admitting Provider Attending Provider Department, Room/Bed    1/15/22 Emergency Tiffany Mccain DO Roesch, Lyndsey, DO [facility] 6B, N638/1    Discharge Date Discharge Disposition Discharge Destination                         Attending Provider: Tiffany Mccain DO    Allergies: No Known Allergies    Isolation: Contact Air   Infection: COVID (confirmed) (01/15/22)   Code Status: CPR   Advance Care Planning Activity    Ht: 190.5 cm (75\")   Wt: 113 kg (250 lb)    Admission Cmt: None   Principal Problem: Traumatic compartment syndrome of left lower extremity (HCC) [T79.A22A]                 Active Insurance as of 1/15/2022     Primary Coverage     Payor Plan Insurance Group Employer/Plan Group    WORKERS COMPENSATION MISC WORKERS COMPENSATION [id number]     Coverage Address Coverage Phone Number Coverage Fax Number Effective Dates    [address] [facility] [phone]  1/15/2022 - None Entered    [place] [id number]       Subscriber Name Subscriber Birth Date Member ID       JAZZMINE BROWN 1990 [id number]                 Emergency Contacts      (Rel.) Home Phone Work Phone Mobile Phone    CURRY BROWN (Mother) [phone] -- --               History & Physical      Johnson, Tavo Feng DO at 01/15/22 [facility]6              [facility] Medicine Services  HISTORY AND PHYSICAL    Patient Name: Jazzmine Brown  : 1990  MRN: [id number]  Primary Care Physician: Provider, No Known  Date of admission: 1/15/2022      Subjective "   Subjective     Chief Complaint:  Leg injury    HPI:  Jagdish Kennedy is a 31 y.o. male without chronic illness who presents for evaluation of left leg injury.  He was at work and his left leg was pinned by a forklift for approximately 7-10 seconds.  He was able to bear weight and continue to work, with his pain waxing and waning.  However, throughout the day the swelling has continued to increase to the extent that he was concerned and presented for evaluation.  He denies numbness or tingling, loss of motor strength, or substantial pain at this moment.  His last meal was approximately 7 AM today.    In the ED he is positive for COVID-19, he is fully vaccinated and denies headache, sinus congestion, sore throat, fever, chills, cough, shortness of breath or any other symptoms.    COVID Details:    Symptoms:    [x] NONE [] Fever []  Cough [] Shortness of breath [] Change in taste/smell      Review of Systems   Gen- No fevers, chills  CV- No chest pain, palpitations  Resp- No cough, dyspnea  GI- No N/V/D, abd pain  All other systems reviewed and are negative.     Personal History     History reviewed. No pertinent past medical history.    Past Surgical History:   Procedure Laterality Date   • TONSILLECTOMY         Family History: Family history reviewed with the patient and is noncontributory to his current presentation    Social History:  reports that he has never smoked. He does not have any smokeless tobacco history on file. He reports that he does not drink alcohol and does not use drugs.  Social History     Social History Narrative   • Not on file       Medications:  Available home medication information reviewed.  (Not in a hospital admission)      No Known Allergies    Objective   Objective     Vital Signs:   Temp:  [99.1 °F (37.3 °C)] 99.1 °F (37.3 °C)  Heart Rate:  [98] 98  Resp:  [16] 16  BP: (118-155)/(67-89) 118/67       Physical Exam   Constitutional: Awake, alert, no acute distress, laying on ED  stretcher  Eyes: PERRL, sclerae anicteric, no conjunctival injection  HENT: NCAT, mucous membranes moist  Neck: Supple, trachea midline  Respiratory: Clear to auscultation bilaterally, nonlabored respirations   Cardiovascular: RRR, no murmurs, rubs, or gallops, palpable radial pulses bilaterally; left foot slightly cool, difficulty palpating pulses  Gastrointestinal: Positive bowel sounds, soft, nontender, nondistended  Musculoskeletal: Left calf significantly enlarged, firm, tender to palpation  Psychiatric: Appropriate affect, cooperative  Neurologic: Oriented x 3, strength symmetric in all extremities, speech clear, sensation intact in bilateral legs and feet  Skin: No rashes    Result Review:  I have personally reviewed the results from the time of this admission to 1/15/2022 15:16 EST and agree with these findings:  [x]  Laboratory  []  Microbiology  []  Radiology  []  EKG/Telemetry   []  Cardiology/Vascular   []  Pathology  []  Old records  []  Other:  Most notable findings include: Positive COVID-19      LAB RESULTS:      Lab 01/15/22  1245   WBC 12.86*   HEMOGLOBIN 15.0   HEMATOCRIT 45.6   PLATELETS 269   NEUTROS ABS 10.62*   IMMATURE GRANS (ABS) 0.05   LYMPHS ABS 1.39   MONOS ABS 0.71   EOS ABS 0.04   MCV 86.4   LACTATE 1.7         Lab 01/15/22  1245   SODIUM 139   POTASSIUM 4.2   CHLORIDE 103   CO2 26.0   ANION GAP 10.0   BUN 14   CREATININE 0.98   GLUCOSE 103*   CALCIUM 9.8         Lab 01/15/22  1245   TOTAL PROTEIN 7.8   ALBUMIN 5.20   GLOBULIN 2.6   ALT (SGPT) 21   AST (SGOT) 20   BILIRUBIN 0.6   ALK PHOS 63                         Microbiology Results (last 10 days)     Procedure Component Value - Date/Time    COVID-19, FLU A/B, RSV PCR - Swab, Nasopharynx [[id number]]  (Abnormal) Collected: 01/15/22 1249    Lab Status: Final result Specimen: Swab from Nasopharynx Updated: 01/15/22 1400     COVID19 Detected     Influenza A PCR Not Detected     Influenza B PCR Not Detected     RSV, PCR Not Detected     Narrative:      Fact sheet for providers: https://www.fda.gov/media/327727/download    Fact sheet for patients: https://www.fda.gov/media/046125/download    Test performed by PCR.          XR Knee 1 or 2 View Left    Result Date: 1/15/2022  EXAMINATION: XR TIBIA FIBULA 2 VW LEFT-, XR FOOT 3+ VW LEFT-, XR KNEE 1 OR 2 VW LEFT-, XR ANKLE 3+ VW LEFT-  INDICATION: injury  COMPARISON: NONE  FINDINGS: There is extensive subcutaneous edema and soft tissue reticulation noted along the medial aspect of the proximal tibia/knee. Cortical margins are otherwise intact, without evidence of acute fracture. Small suprapatellar joint effusion is present.  The ankle mortise is symmetric with an intact talar dome. No fracture or dislocation. Cortical margins of the left foot are maintained without evidence of fracture, dislocation or suspicious osseous lesion. There is no evidence of subluxation or dislocation.      Impression: Fairly extensive superficial soft tissue edema is noted along the medial aspect of the left knee and proximal tibia, with small suprapatellar joint effusion also noted. The remainder of the imaged left lower extremity otherwise demonstrates no evidence of acute osseous or articular abnormality.  This report was finalized on 1/15/2022 12:46 PM by Yordan Melendez.      XR Tibia Fibula 2 View Left    Result Date: 1/15/2022  EXAMINATION: XR TIBIA FIBULA 2 VW LEFT-, XR FOOT 3+ VW LEFT-, XR KNEE 1 OR 2 VW LEFT-, XR ANKLE 3+ VW LEFT-  INDICATION: injury  COMPARISON: NONE  FINDINGS: There is extensive subcutaneous edema and soft tissue reticulation noted along the medial aspect of the proximal tibia/knee. Cortical margins are otherwise intact, without evidence of acute fracture. Small suprapatellar joint effusion is present.  The ankle mortise is symmetric with an intact talar dome. No fracture or dislocation. Cortical margins of the left foot are maintained without evidence of fracture, dislocation or suspicious  osseous lesion. There is no evidence of subluxation or dislocation.      Impression: Fairly extensive superficial soft tissue edema is noted along the medial aspect of the left knee and proximal tibia, with small suprapatellar joint effusion also noted. The remainder of the imaged left lower extremity otherwise demonstrates no evidence of acute osseous or articular abnormality.  This report was finalized on 1/15/2022 12:46 PM by Yordan Melendez.      XR Ankle 3+ View Left    Result Date: 1/15/2022  EXAMINATION: XR TIBIA FIBULA 2 VW LEFT-, XR FOOT 3+ VW LEFT-, XR KNEE 1 OR 2 VW LEFT-, XR ANKLE 3+ VW LEFT-  INDICATION: injury  COMPARISON: NONE  FINDINGS: There is extensive subcutaneous edema and soft tissue reticulation noted along the medial aspect of the proximal tibia/knee. Cortical margins are otherwise intact, without evidence of acute fracture. Small suprapatellar joint effusion is present.  The ankle mortise is symmetric with an intact talar dome. No fracture or dislocation. Cortical margins of the left foot are maintained without evidence of fracture, dislocation or suspicious osseous lesion. There is no evidence of subluxation or dislocation.      Impression: Fairly extensive superficial soft tissue edema is noted along the medial aspect of the left knee and proximal tibia, with small suprapatellar joint effusion also noted. The remainder of the imaged left lower extremity otherwise demonstrates no evidence of acute osseous or articular abnormality.  This report was finalized on 1/15/2022 12:46 PM by Yordan Melendez.      XR Foot 3+ View Left    Result Date: 1/15/2022  EXAMINATION: XR TIBIA FIBULA 2 VW LEFT-, XR FOOT 3+ VW LEFT-, XR KNEE 1 OR 2 VW LEFT-, XR ANKLE 3+ VW LEFT-  INDICATION: injury  COMPARISON: NONE  FINDINGS: There is extensive subcutaneous edema and soft tissue reticulation noted along the medial aspect of the proximal tibia/knee. Cortical margins are otherwise intact, without evidence of  acute fracture. Small suprapatellar joint effusion is present.  The ankle mortise is symmetric with an intact talar dome. No fracture or dislocation. Cortical margins of the left foot are maintained without evidence of fracture, dislocation or suspicious osseous lesion. There is no evidence of subluxation or dislocation.      Impression: Fairly extensive superficial soft tissue edema is noted along the medial aspect of the left knee and proximal tibia, with small suprapatellar joint effusion also noted. The remainder of the imaged left lower extremity otherwise demonstrates no evidence of acute osseous or articular abnormality.  This report was finalized on 1/15/2022 12:46 PM by Yordan Melendez.            Assessment/Plan   Assessment & Plan     Active Hospital Problems    Diagnosis  POA   • **Traumatic compartment syndrome of left lower extremity (HCC) [T79.A22A]  Yes   • Lab test positive for detection of COVID-19 virus [U07.1]  Yes     Summary: This is a 31-year-old healthy male fully vaccinated for COVID-19 who presents after traumatic injury to his left leg with subsequent pain and increasing swelling being admitted for concern of compartment syndrome; he was incidentally and asymptomatically COVID-19 positive in the ED    Assessment/plan    Traumatic injury to the left leg  Compartment syndrome of the left calf/leg  -Last meal 7 AM today; remain n.p.o.  -ED has discussed with Dr. Zhu, patient is to be made the next case  - Oral and IV pain medicines ordered  - Holding DVT PPx pending surgery (cannot put compression device on left leg, avoiding anticoagulants for impending surgery) -will need prophylaxis postop once cleared by orthopedics    Asymptomatic COVID-19 positive lab test  -Fully vaccinated for the same  - Starting false positive protocol to make determination on precautions, repeat swab ordered    DVT prophylaxis: Pending surgery and orthopedic recommendations      CODE STATUS:    Code Status and  Medical Interventions:   Ordered at: 01/15/22 1516     Code Status (Patient has no pulse and is not breathing):    CPR (Attempt to Resuscitate)     Medical Interventions (Patient has pulse or is breathing):    Full Support       Admission Status:  I believe this patient meets INPATIENT status due to traumatic injury to the leg requiring emergent surgery.  I feel patient’s risk for adverse outcomes and need for care warrant INPATIENT evaluation and I predict the patient’s care encounter to likely last beyond 2 midnights.      Tavo Johnson DO  01/15/22      Electronically signed by Tavo Johnson DO at 01/15/22 1528       Vital Signs (last 3 days)     Date/Time Temp Temp src Pulse Resp BP Patient Position SpO2    01/18/22 0900 -- -- 67 -- -- -- 97    01/18/22 0700 96.1 (35.6) Oral 70 18 132/75 Lying 96    01/18/22 0428 96.3 (35.7) Oral 76 18 131/69 Lying 99    01/18/22 0035 96.5 (35.8) Oral -- 18 -- Lying --    01/17/22 1954 97.2 (36.2) Oral -- 18 124/68 Lying --    01/17/22 1400 97.4 (36.3) Oral 89 18 139/70 Lying 98    01/17/22 1202 -- -- 79 -- -- -- 99    01/17/22 1201 97.7 (36.5) Oral 70 18 127/73 Lying --    01/17/22 0843 -- -- 64 -- 117/70 -- 100    01/17/22 0710 97.2 (36.2) Oral 55 18 97/60 Lying --    01/17/22 0600 -- -- 64 -- 100/56 -- 98    01/17/22 0415 96.9 (36.1) Oral 70 18 116/74 Lying 96    01/17/22 0200 -- -- 64 -- 114/72 -- 97    01/17/22 0000 96.7 (35.9) Oral 66 18 124/80 Sitting 96    01/16/22 2200 -- -- -- -- 116/61 -- --    01/16/22 2000 96.6 (35.9) Oral 68 16 127/80 Sitting 100    01/16/22 1800 -- -- -- -- 115/70 -- --    01/16/22 1600 97.5 (36.4) Oral 70 16 127/74 Sitting 100    01/16/22 1114 96.8 (36) Oral 93 18 127/67 Sitting 97    01/16/22 0700 97.1 (36.2) Oral 87 18 114/63 Sitting 96    01/16/22 0600 -- -- 61 -- 96/56 -- 98    01/16/22 0500 -- -- 59 -- 99/55 -- 98    01/16/22 0400 -- -- 60 -- 106/58 -- 97    01/16/22 0300 -- -- 65 18 103/54 Lying --    01/16/22 0200 -- -- 61  -- 108/72 -- 98    01/16/22 0100 -- -- 71 -- 107/63 -- --    01/16/22 0042 96.9 (36.1) -- 83 -- 114/67 -- 99    01/16/22 0000 -- -- 59 -- 99/59 -- --    01/15/22 2330 -- -- 67 -- 105/56 -- --    01/15/22 2300 -- -- 68 18 99/59 -- --    01/15/22 2200 -- -- 80 -- 106/57 -- --    01/15/22 2100 -- -- 91 -- 113/73 -- --    01/15/22 1900 98.2 (36.8) Oral 73 18 125/76 Lying --    01/15/22 1845 -- -- 71 -- 122/75 -- 99    01/15/22 1830 -- -- 73 -- 117/69 -- 96    01/15/22 1815 -- -- 78 -- 117/73 -- 98    01/15/22 1745 -- -- 77 -- 125/74 -- 96    01/15/22 1718 97.2 (36.2) Oral 84 16 129/76 Lying 100    01/15/22 1700 98.1 (36.7) -- 79 16 132/70 -- 99    01/15/22 1645 98.2 (36.8) -- 77 16 130/77 -- 99    01/15/22 1640 98.4 (36.9) -- 67 16 131/70 -- 99    01/15/22 1635 98.1 (36.7) -- 91 18 102/52 -- 96    01/15/22 1630 98.4 (36.9) -- 61 16 111/60 -- 96    01/15/22 1536 -- -- -- -- 122/65 -- --    01/15/22 1501 -- -- -- -- 129/68 -- 100    01/15/22 1432 -- -- -- -- 124/72 -- 99    01/15/22 1359 -- -- -- -- 118/67 -- 95    01/15/22 1228 -- -- -- -- 130/78 -- 99    01/15/22 1109 99.1 (37.3) Oral 98 16 155/89 Sitting 99          Oxygen Therapy (last 3 days)     Date/Time SpO2 Device (Oxygen Therapy) Flow (L/min) Oxygen Concentration (%) ETCO2 (mmHg)    01/18/22 0900 97 -- -- -- --    01/18/22 0840 -- room air -- -- --    01/18/22 0700 96 -- -- -- --    01/18/22 0626 -- room air -- -- --    01/18/22 0432 -- room air -- -- --    01/18/22 0428 99 room air -- -- --    01/18/22 0219 -- room air -- -- --    01/18/22 0019 -- room air -- -- --    01/17/22 2219 -- room air -- -- --    01/17/22 2019 -- room air -- -- --    01/17/22 1816 -- room air -- -- --    01/17/22 1400 98 room air -- -- --    01/17/22 1202 99 room air -- -- --    01/17/22 1026 -- room air -- -- --    01/17/22 0843 100 room air -- -- --    01/17/22 0840 -- room air -- -- --    01/17/22 0600 98 -- -- -- --    01/17/22 0415 96 -- -- -- --    01/17/22 0245 -- room air -- --  --    22 0214 -- room air -- -- --    22 0200 97 -- -- -- --    22 0104 -- room air -- -- --    22 0000 96 room air -- -- --    22 2350 -- room air -- -- --    22 2158 -- room air -- -- --    22 2057 -- room air -- -- --    22 2000 100 -- -- -- --    22 1634 -- room air -- -- --    22 1600 100 room air -- -- --    22 1152 -- room air -- -- --    22 1114 97 room air -- -- --    22 1010 -- room air -- -- --    22 0810 -- room air -- -- --    22 0700 96 room air -- -- --    22 0600 98 room air -- -- --    22 0510 -- room air -- -- --    22 0500 98 -- -- -- --    22 0410 -- room air -- -- --    22 0400 97 -- -- -- --    22 0200 98 -- -- -- --    22 0156 -- room air -- -- --    22 0042 99 room air -- -- --    01/15/22 2230 -- room air -- -- --    01/15/22 2109 -- room air -- -- --    01/15/22 1940 -- room air -- -- --    01/15/22 1845 99 room air -- -- --    01/15/22 1830 96 -- -- -- --    01/15/22 1815 98 -- -- -- --    01/15/22 1745 96 -- -- -- --    01/15/22 1725 -- room air -- -- --    01/15/22 1718 100 room air -- -- --    01/15/22 1700 99 room air -- -- --    01/15/22 1645 99 room air -- -- --    01/15/22 1640 99 room air -- -- --    01/15/22 1635 96 -- -- -- --    01/15/22 1630 96 -- -- -- --    01/15/22 1501 100 -- -- -- --    01/15/22 1432 99 -- -- -- --    01/15/22 1359 95 -- -- -- --    01/15/22 1228 99 -- -- -- --    01/15/22 1109 99 room air -- -- --           Physician Progress Notes (last 72 hours)      Chance Zhu MD at 22 1819          Orthopaedic Surgery Progress Note     LOS: 2 days   Patient Care Team:  Provider, No Known as PCP - General    POD 2    Subjective     Interval History:   Patient Reports: no new complaints    Objective     Vital Signs:  Temp (24hrs), Av.1 °F (36.2 °C), Min:96.6 °F (35.9 °C), Max:97.7 °F (36.5 °C)    /70 (BP  "Location: Left arm, Patient Position: Lying)   Pulse 89   Temp 97.4 °F (36.3 °C) (Oral)   Resp 18   Ht 190.5 cm (75\")   Wt 113 kg (250 lb)   SpO2 98%   BMI 31.25 kg/m²     Labs:  Lab Results (last 24 hours)     Procedure Component Value Units Date/Time    Comprehensive Metabolic Panel [[id number]]  (Abnormal) Collected: 01/17/22 1542    Specimen: Blood Updated: 01/17/22 1659     Glucose 102 mg/dL      BUN 12 mg/dL      Creatinine 1.16 mg/dL      Sodium 139 mmol/L      Potassium 4.5 mmol/L      Comment: Slight hemolysis detected by analyzer. Results may be affected.        Chloride 104 mmol/L      CO2 28.0 mmol/L      Calcium 8.8 mg/dL      Total Protein 6.1 g/dL      Albumin 3.90 g/dL      ALT (SGPT) 14 U/L      AST (SGOT) 18 U/L      Alkaline Phosphatase 53 U/L      Total Bilirubin 0.3 mg/dL      eGFR Non African Amer 74 mL/min/1.73      Globulin 2.2 gm/dL      Comment: Calculated Result        A/G Ratio 1.8 g/dL      BUN/Creatinine Ratio 10.3     Anion Gap 7.0 mmol/L     Narrative:      GFR Normal >60  Chronic Kidney Disease <60  Kidney Failure <15      CBC (No Diff) [[id number]]  (Abnormal) Collected: 01/17/22 1201    Specimen: Blood Updated: 01/17/22 1227     WBC 8.14 10*3/mm3      RBC 4.26 10*6/mm3      Hemoglobin 12.2 g/dL      Hematocrit 37.9 %      MCV 89.0 fL      MCH 28.6 pg      MCHC 32.2 g/dL      RDW 14.0 %      RDW-SD 45.9 fl      MPV 11.0 fL      Platelets 211 10*3/mm3           Imaging:  MRI Knee Left Without Contrast  Narrative: EXAMINATION: MRI KNEE LEFT  WO CONTRAST- 01/17/2022     INDICATION: Knee instability      TECHNIQUE: Multiplanar MRI of the knee without intravenous contrast     COMPARISON: Knee x-ray 01/15/2022     FINDINGS: Extensor mechanism intact with quadriceps and patellar tendons  in normal limits. Subcutaneous edema throughout including prepatellar  edema.     Patellofemoral joint space reveals no evidence for high-grade  chondromalacia or disruption of the patellar " retinaculum with small  suprapatellar effusion however no evidence for loose body.     Femorotibial joint spaces reveal preserved distal femoral and proximal  tibial plateau cortical margins. ACL and PCL intact. Lateral meniscus  intact and medial meniscus intact. MCL intact and unremarkable. Lateral  ligamentous complex reveals lateral collateral intact and fibular  collateral intact with questionable increased signal involving the  distal popliteal tendon likely adjacent or relative fluid signal from  adjacent subcutaneous edema without irregularity or disruption.     Impression: Extensive subcutaneous soft tissue edema throughout the  visualized soft tissues and field-of-view including prepatellar edema.  Trace suprapatellar edema may be secondary to the subcutaneous  involvement otherwise no internal derangement.      D:  2022  E:  2022     This report was finalized on 2022 9:39 AM by Dr. Hai Escalante.          Physical Exam:  Leg leg, compartments soft, pulses intact    Assessment/Plan   POD 2 s/p left leg fasciotomy  To OR for wound closure tomorrow    Chance Zhu MD  22  18:19 EST          Electronically signed by Chance Zhu MD at 22 1821     Tiffany Mccain DO at 22 0912              [facility] Medicine Services  PROGRESS NOTE    Patient Name: Jagdish Kennedy  : 1990  MRN: [id number]    Date of Admission: 1/15/2022  Primary Care Physician: Provider, No Known    Subjective   Subjective     CC:  Leg pain     HPI:  No acute events. Pain with fair control. No BM.     ROS:  Gen- No fevers, chills  CV- No chest pain, palpitations  Resp- No cough, dyspnea  GI- No N/V/D, abd pain     Objective   Objective     Vital Signs:   Temp:  [96.6 °F (35.9 °C)-97.5 °F (36.4 °C)] 97.2 °F (36.2 °C)  Heart Rate:  [55-93] 55  Resp:  [16-18] 18  BP: ()/(56-80) 97/60     Physical Exam:  Constitutional: No acute distress, awake, alert  HENT:  NCAT, mucous membranes moist  Respiratory: Clear to auscultation bilaterally, respiratory effort normal   Cardiovascular: RRR, no murmurs, rubs, or gallops  Gastrointestinal: Positive bowel sounds, soft, nontender, nondistended  Musculoskeletal: left leg with wound vac in place; swelling up knee/thigh   Psychiatric: Appropriate affect, cooperative  Neurologic: Oriented x 3, strength symmetric in all extremities, Cranial Nerves grossly intact to confrontation, speech clear  Skin: No rashes    Results Reviewed:  LAB RESULTS:      Lab 01/15/22  1245   WBC 12.86*   HEMOGLOBIN 15.0   HEMATOCRIT 45.6   PLATELETS 269   NEUTROS ABS 10.62*   IMMATURE GRANS (ABS) 0.05   LYMPHS ABS 1.39   MONOS ABS 0.71   EOS ABS 0.04   MCV 86.4   LACTATE 1.7         Lab 01/15/22  1245   SODIUM 139   POTASSIUM 4.2   CHLORIDE 103   CO2 26.0   ANION GAP 10.0   BUN 14   CREATININE 0.98   GLUCOSE 103*   CALCIUM 9.8         Lab 01/15/22  1245   TOTAL PROTEIN 7.8   ALBUMIN 5.20   GLOBULIN 2.6   ALT (SGPT) 21   AST (SGOT) 20   BILIRUBIN 0.6   ALK PHOS 63                 Lab 01/15/22  1736   ABO TYPING O   RH TYPING Positive   ANTIBODY SCREEN Negative         Brief Urine Lab Results     None          Microbiology Results Abnormal     None          XR Knee 1 or 2 View Left    Result Date: 1/15/2022  EXAMINATION: XR TIBIA FIBULA 2 VW LEFT-, XR FOOT 3+ VW LEFT-, XR KNEE 1 OR 2 VW LEFT-, XR ANKLE 3+ VW LEFT-  INDICATION: injury  COMPARISON: NONE  FINDINGS: There is extensive subcutaneous edema and soft tissue reticulation noted along the medial aspect of the proximal tibia/knee. Cortical margins are otherwise intact, without evidence of acute fracture. Small suprapatellar joint effusion is present.  The ankle mortise is symmetric with an intact talar dome. No fracture or dislocation. Cortical margins of the left foot are maintained without evidence of fracture, dislocation or suspicious osseous lesion. There is no evidence of subluxation or dislocation.       Impression: Fairly extensive superficial soft tissue edema is noted along the medial aspect of the left knee and proximal tibia, with small suprapatellar joint effusion also noted. The remainder of the imaged left lower extremity otherwise demonstrates no evidence of acute osseous or articular abnormality.  This report was finalized on 1/15/2022 12:46 PM by Yordan Melendez.      XR Tibia Fibula 2 View Left    Result Date: 1/15/2022  EXAMINATION: XR TIBIA FIBULA 2 VW LEFT-, XR FOOT 3+ VW LEFT-, XR KNEE 1 OR 2 VW LEFT-, XR ANKLE 3+ VW LEFT-  INDICATION: injury  COMPARISON: NONE  FINDINGS: There is extensive subcutaneous edema and soft tissue reticulation noted along the medial aspect of the proximal tibia/knee. Cortical margins are otherwise intact, without evidence of acute fracture. Small suprapatellar joint effusion is present.  The ankle mortise is symmetric with an intact talar dome. No fracture or dislocation. Cortical margins of the left foot are maintained without evidence of fracture, dislocation or suspicious osseous lesion. There is no evidence of subluxation or dislocation.      Impression: Fairly extensive superficial soft tissue edema is noted along the medial aspect of the left knee and proximal tibia, with small suprapatellar joint effusion also noted. The remainder of the imaged left lower extremity otherwise demonstrates no evidence of acute osseous or articular abnormality.  This report was finalized on 1/15/2022 12:46 PM by Yordan Melendez.      XR Ankle 3+ View Left    Result Date: 1/15/2022  EXAMINATION: XR TIBIA FIBULA 2 VW LEFT-, XR FOOT 3+ VW LEFT-, XR KNEE 1 OR 2 VW LEFT-, XR ANKLE 3+ VW LEFT-  INDICATION: injury  COMPARISON: NONE  FINDINGS: There is extensive subcutaneous edema and soft tissue reticulation noted along the medial aspect of the proximal tibia/knee. Cortical margins are otherwise intact, without evidence of acute fracture. Small suprapatellar joint effusion is present.   The ankle mortise is symmetric with an intact talar dome. No fracture or dislocation. Cortical margins of the left foot are maintained without evidence of fracture, dislocation or suspicious osseous lesion. There is no evidence of subluxation or dislocation.      Impression: Fairly extensive superficial soft tissue edema is noted along the medial aspect of the left knee and proximal tibia, with small suprapatellar joint effusion also noted. The remainder of the imaged left lower extremity otherwise demonstrates no evidence of acute osseous or articular abnormality.  This report was finalized on 1/15/2022 12:46 PM by Yordan Melendez.      XR Foot 3+ View Left    Result Date: 1/15/2022  EXAMINATION: XR TIBIA FIBULA 2 VW LEFT-, XR FOOT 3+ VW LEFT-, XR KNEE 1 OR 2 VW LEFT-, XR ANKLE 3+ VW LEFT-  INDICATION: injury  COMPARISON: NONE  FINDINGS: There is extensive subcutaneous edema and soft tissue reticulation noted along the medial aspect of the proximal tibia/knee. Cortical margins are otherwise intact, without evidence of acute fracture. Small suprapatellar joint effusion is present.  The ankle mortise is symmetric with an intact talar dome. No fracture or dislocation. Cortical margins of the left foot are maintained without evidence of fracture, dislocation or suspicious osseous lesion. There is no evidence of subluxation or dislocation.      Impression: Fairly extensive superficial soft tissue edema is noted along the medial aspect of the left knee and proximal tibia, with small suprapatellar joint effusion also noted. The remainder of the imaged left lower extremity otherwise demonstrates no evidence of acute osseous or articular abnormality.  This report was finalized on 1/15/2022 12:46 PM by Yordan Melendez.      MRI Knee Left Without Contrast    Result Date: 1/17/2022  EXAMINATION: MRI KNEE LEFT  WO CONTRAST- 01/17/2022  INDICATION: Knee instability  TECHNIQUE: Multiplanar MRI of the knee without intravenous  contrast  COMPARISON: Knee x-ray 01/15/2022  FINDINGS: Extensor mechanism intact with quadriceps and patellar tendons in normal limits. Subcutaneous edema throughout including prepatellar edema.  Patellofemoral joint space reveals no evidence for high-grade chondromalacia or disruption of the patellar retinaculum with small suprapatellar effusion however no evidence for loose body.  Femorotibial joint spaces reveal preserved distal femoral and proximal tibial plateau cortical margins. ACL and PCL intact. Lateral meniscus intact and medial meniscus intact. MCL intact and unremarkable. Lateral ligamentous complex reveals lateral collateral intact and fibular collateral intact with questionable increased signal involving the distal popliteal tendon likely adjacent or relative fluid signal from adjacent subcutaneous edema without irregularity or disruption.      Impression: Extensive subcutaneous soft tissue edema throughout the visualized soft tissues and field-of-view including prepatellar edema. Trace suprapatellar edema may be secondary to the subcutaneous involvement otherwise no internal derangement.   D:  01/17/2022 E:  01/17/2022            I have reviewed the medications:  Scheduled Meds:enoxaparin, 40 mg, Subcutaneous, Q24H  sodium chloride, 10 mL, Intravenous, Q12H      Continuous Infusions:sodium chloride, 100 mL/hr, Last Rate: 100 mL/hr (01/17/22 0105)      PRN Meds:.•  acetaminophen **OR** acetaminophen **OR** acetaminophen  •  HYDROcodone-acetaminophen  •  HYDROmorphone **AND** naloxone  •  ondansetron **OR** ondansetron  •  sodium chloride    Assessment/Plan   Assessment & Plan     Active Hospital Problems    Diagnosis  POA   • **Traumatic compartment syndrome of left lower extremity (HCC) [T79.A22A]  Yes   • Lab test positive for detection of COVID-19 virus [U07.1]  Yes      Resolved Hospital Problems   No resolved problems to display.        Brief Hospital Course to date:  Jagdish Kennedy is a 31  y.o. male fully vaccinated for COVID-19 who presented after traumatic injury to his left leg at work with subsequent pain and increasing swelling. He was admitted for concern of compartment syndrome; he was incidentally and asymptomatically COVID-19 positive in the ED. Patient was taken to the OR 1/15 by Dr. Zhu and fasciotomy performed.     Assessment/plan     Traumatic injury to the left leg  Compartment syndrome of the left calf/leg  - s/p fasciotomy per Dr. Zhu to the LLE (1/15)  - Wound vac in place and PT are following   - PT/OT - weight bearing as tolerated   - Wound care  - Cefazolin 2gm IV x 24 hrs post op per ortho surgery  - plan to return to OR Tuesday for wound washout and closure of fasciotomy  - Oral and IV pain medicines ordered  -  DVT Ppx - lovenox   - rehab recs      Asymptomatic COVID-19 positive lab test  - Fully vaccinated for the same  - Repeat COVID test positive.  - Does not qualify for antiviral- asymptomatic at this time and on RA    DVT prophylaxis:  Medical and mechanical DVT prophylaxis orders are present.       AM-PAC 6 Clicks Score (PT): 18 (22 0938)    Disposition: I expect the patient to be discharged TBD    CODE STATUS:   Code Status and Medical Interventions:   Ordered at: 01/15/22 1516     Code Status (Patient has no pulse and is not breathing):    CPR (Attempt to Resuscitate)     Medical Interventions (Patient has pulse or is breathing):    Full Support       Tiffany Mccain DO  22                Electronically signed by Tiffany Mccain DO at 22 0996     Kristin Zhu DO at 22 1046              [facility] Medicine Services  PROGRESS NOTE    Patient Name: Jagdish Kennedy  : 1990  MRN: [id number]    Date of Admission: 1/15/2022  Primary Care Physician: Provider, No Known    Subjective   Subjective     CC:  Leg injury    HPI:  Patient lying in bed, in good spirits. States he is feeling ok, some pain in his left leg  but overall pain is controlled. Denies any symptoms related to his COVID-19 diagnosis.    ROS:  Gen- No fevers, chills  CV- No chest pain, palpitations  Resp- No cough, dyspnea  GI- No N/V/D, abd pain    Objective   Objective     Vital Signs:   Temp:  [96.9 °F (36.1 °C)-99.1 °F (37.3 °C)] 97.1 °F (36.2 °C)  Heart Rate:  [59-98] 87  Resp:  [16-18] 18  BP: ()/(52-89) 114/63     Physical Exam:  Constitutional: Awake, alert, no acute distress  Eyes: PERRL, sclerae anicteric, no conjunctival injection  HENT: NCAT, mucous membranes moist  Neck: Supple, trachea midline  Respiratory: Clear to auscultation bilaterally, nonlabored respirations   Cardiovascular: RRR, no murmurs, rubs, or gallops, left lower extremity wrapped with wound vac  Gastrointestinal: Positive bowel sounds, soft, nontender, nondistended  Musculoskeletal: Left calf significantly enlarged, firm, tender to palpation  Psychiatric: Appropriate affect, cooperative  Neurologic: Oriented x 3, strength symmetric in all extremities, speech clear, sensation intact in bilateral legs and feet  Skin: No rashes    Results Reviewed:  LAB RESULTS:      Lab 01/15/22  1245   WBC 12.86*   HEMOGLOBIN 15.0   HEMATOCRIT 45.6   PLATELETS 269   NEUTROS ABS 10.62*   IMMATURE GRANS (ABS) 0.05   LYMPHS ABS 1.39   MONOS ABS 0.71   EOS ABS 0.04   MCV 86.4   LACTATE 1.7         Lab 01/15/22  1245   SODIUM 139   POTASSIUM 4.2   CHLORIDE 103   CO2 26.0   ANION GAP 10.0   BUN 14   CREATININE 0.98   GLUCOSE 103*   CALCIUM 9.8         Lab 01/15/22  1245   TOTAL PROTEIN 7.8   ALBUMIN 5.20   GLOBULIN 2.6   ALT (SGPT) 21   AST (SGOT) 20   BILIRUBIN 0.6   ALK PHOS 63                 Lab 01/15/22  1736   ABO TYPING O   RH TYPING Positive   ANTIBODY SCREEN Negative         Brief Urine Lab Results     None          Microbiology Results Abnormal     None          XR Knee 1 or 2 View Left    Result Date: 1/15/2022  EXAMINATION: XR TIBIA FIBULA 2 VW LEFT-, XR FOOT 3+ VW LEFT-, XR KNEE 1 OR 2  VW LEFT-, XR ANKLE 3+ VW LEFT-  INDICATION: injury  COMPARISON: NONE  FINDINGS: There is extensive subcutaneous edema and soft tissue reticulation noted along the medial aspect of the proximal tibia/knee. Cortical margins are otherwise intact, without evidence of acute fracture. Small suprapatellar joint effusion is present.  The ankle mortise is symmetric with an intact talar dome. No fracture or dislocation. Cortical margins of the left foot are maintained without evidence of fracture, dislocation or suspicious osseous lesion. There is no evidence of subluxation or dislocation.      Impression: Fairly extensive superficial soft tissue edema is noted along the medial aspect of the left knee and proximal tibia, with small suprapatellar joint effusion also noted. The remainder of the imaged left lower extremity otherwise demonstrates no evidence of acute osseous or articular abnormality.  This report was finalized on 1/15/2022 12:46 PM by Yordan Melendez.      XR Tibia Fibula 2 View Left    Result Date: 1/15/2022  EXAMINATION: XR TIBIA FIBULA 2 VW LEFT-, XR FOOT 3+ VW LEFT-, XR KNEE 1 OR 2 VW LEFT-, XR ANKLE 3+ VW LEFT-  INDICATION: injury  COMPARISON: NONE  FINDINGS: There is extensive subcutaneous edema and soft tissue reticulation noted along the medial aspect of the proximal tibia/knee. Cortical margins are otherwise intact, without evidence of acute fracture. Small suprapatellar joint effusion is present.  The ankle mortise is symmetric with an intact talar dome. No fracture or dislocation. Cortical margins of the left foot are maintained without evidence of fracture, dislocation or suspicious osseous lesion. There is no evidence of subluxation or dislocation.      Impression: Fairly extensive superficial soft tissue edema is noted along the medial aspect of the left knee and proximal tibia, with small suprapatellar joint effusion also noted. The remainder of the imaged left lower extremity otherwise  demonstrates no evidence of acute osseous or articular abnormality.  This report was finalized on 1/15/2022 12:46 PM by Yordan Melendez.      XR Ankle 3+ View Left    Result Date: 1/15/2022  EXAMINATION: XR TIBIA FIBULA 2 VW LEFT-, XR FOOT 3+ VW LEFT-, XR KNEE 1 OR 2 VW LEFT-, XR ANKLE 3+ VW LEFT-  INDICATION: injury  COMPARISON: NONE  FINDINGS: There is extensive subcutaneous edema and soft tissue reticulation noted along the medial aspect of the proximal tibia/knee. Cortical margins are otherwise intact, without evidence of acute fracture. Small suprapatellar joint effusion is present.  The ankle mortise is symmetric with an intact talar dome. No fracture or dislocation. Cortical margins of the left foot are maintained without evidence of fracture, dislocation or suspicious osseous lesion. There is no evidence of subluxation or dislocation.      Impression: Fairly extensive superficial soft tissue edema is noted along the medial aspect of the left knee and proximal tibia, with small suprapatellar joint effusion also noted. The remainder of the imaged left lower extremity otherwise demonstrates no evidence of acute osseous or articular abnormality.  This report was finalized on 1/15/2022 12:46 PM by Yordan Melendez.      XR Foot 3+ View Left    Result Date: 1/15/2022  EXAMINATION: XR TIBIA FIBULA 2 VW LEFT-, XR FOOT 3+ VW LEFT-, XR KNEE 1 OR 2 VW LEFT-, XR ANKLE 3+ VW LEFT-  INDICATION: injury  COMPARISON: NONE  FINDINGS: There is extensive subcutaneous edema and soft tissue reticulation noted along the medial aspect of the proximal tibia/knee. Cortical margins are otherwise intact, without evidence of acute fracture. Small suprapatellar joint effusion is present.  The ankle mortise is symmetric with an intact talar dome. No fracture or dislocation. Cortical margins of the left foot are maintained without evidence of fracture, dislocation or suspicious osseous lesion. There is no evidence of subluxation or  dislocation.      Impression: Fairly extensive superficial soft tissue edema is noted along the medial aspect of the left knee and proximal tibia, with small suprapatellar joint effusion also noted. The remainder of the imaged left lower extremity otherwise demonstrates no evidence of acute osseous or articular abnormality.  This report was finalized on 1/15/2022 12:46 PM by Yordan Melendez.            I have reviewed the medications:  Scheduled Meds:enoxaparin, 40 mg, Subcutaneous, Q24H  sodium chloride, 10 mL, Intravenous, Q12H      Continuous Infusions:sodium chloride, 100 mL/hr, Last Rate: 100 mL/hr (01/16/22 0812)      PRN Meds:.•  acetaminophen **OR** acetaminophen **OR** acetaminophen  •  HYDROcodone-acetaminophen  •  HYDROmorphone **AND** naloxone  •  ondansetron **OR** ondansetron  •  sodium chloride    Assessment/Plan   Assessment & Plan     Active Hospital Problems    Diagnosis  POA   • **Traumatic compartment syndrome of left lower extremity (HCC) [T79.A22A]  Yes   • Lab test positive for detection of COVID-19 virus [U07.1]  Yes      Resolved Hospital Problems   No resolved problems to display.        Brief Hospital Course to date:  Jagdish Kennedy is a 31 y.o. male fully vaccinated for COVID-19 who presented after traumatic injury to his left leg at work with subsequent pain and increasing swelling. He was admitted for concern of compartment syndrome; he was incidentally and asymptomatically COVID-19 positive in the ED. Patient was taken to the OR yesterday by Dr. Zhu and fasciotomy performed.     Assessment/plan     Traumatic injury to the left leg  Compartment syndrome of the left calf/leg  - POD #1 s/p fasciotomy per Dr. Zhu to the LLE  - Wound vac in place  - PT/OT  - Wound care  - Cefazolin 2gm IV x 24 hrs post op per ortho surgery  - plan to return to OR Tuesday for wound washout and closure of fasciotomy  - Oral and IV pain medicines ordered  - Holding DVT PPx pending surgery (cannot put  "compression device on left leg, avoiding anticoagulants for impending surgery) -will need prophylaxis postop once cleared by orthopedics     Asymptomatic COVID-19 positive lab test  -Fully vaccinated for the same  - Repeat COVID test positive.  - Does not qualify for antiviral- asymptomatic at this time and on RA    DVT prophylaxis:  Medical and mechanical DVT prophylaxis orders are present.       AM-PAC 6 Clicks Score (PT): 18 (22 0938)    Disposition: I expect the patient to be discharged TBD.    CODE STATUS:   Code Status and Medical Interventions:   Ordered at: 01/15/22 1516     Code Status (Patient has no pulse and is not breathing):    CPR (Attempt to Resuscitate)     Medical Interventions (Patient has pulse or is breathing):    Full Support       Kristin Zhu DO  22                Electronically signed by Kristin Zhu DO at 22 1109     Chance Zhu MD at 22 0914          Orthopaedic Surgery Progress Note     LOS: 1 day   Patient Care Team:  Provider, No Known as PCP - General    POD 1    Subjective     Interval History:   Patient Reports: Patient awake eating breakfast and on his laptop computer.  He appears comfortable.  His only new complaint is some medial sided left knee pain.  His left lower leg feels better.    Objective     Vital Signs:  Temp (24hrs), Av °F (36.7 °C), Min:96.9 °F (36.1 °C), Max:99.1 °F (37.3 °C)    /63 (BP Location: Left arm, Patient Position: Sitting)   Pulse 87   Temp 97.1 °F (36.2 °C) (Oral)   Resp 18   Ht 190.5 cm (75\")   Wt 113 kg (250 lb)   SpO2 96%   BMI 31.25 kg/m²     Labs:  Lab Results (last 24 hours)     Procedure Component Value Units Date/Time    COVID PRE-OP / PRE-PROCEDURE SCREENING ORDER (NO ISOLATION) - Swab, Nasopharynx [[id number]]  (Abnormal) Collected: 01/15/22 1941    Specimen: Swab from Nasopharynx Updated: 01/15/22 2044    Narrative:      The following orders were created for panel order COVID PRE-OP / " PRE-PROCEDURE SCREENING ORDER (NO ISOLATION) - Swab, Nasopharynx.  Procedure                               Abnormality         Status                     ---------                               -----------         ------                     COVID-19 and FLU A/B PCR...[[id number]]  Abnormal            Final result                 Please view results for these tests on the individual orders.    COVID-19 and FLU A/B PCR - Swab, Nasopharynx [[id number]]  (Abnormal) Collected: 01/15/22 1941    Specimen: Swab from Nasopharynx Updated: 01/15/22 2044     COVID19 Detected     Influenza A PCR Not Detected     Influenza B PCR Not Detected    Narrative:      Fact sheet for providers: https://www.fda.gov/media/362675/download    Fact sheet for patients: https://www.fda.gov/media/346127/download    Test performed by PCR.  Influenza A and Influenza B negative results should be considered presumptive in samples that have a positive SARS-CoV-2 result.    Competitive inhibition studies showed that SARS-CoV-2 virus, when present at concentrations above 3.6E+04 copies/mL, can inhibit the detection and amplification of influenza A and influenza B virus RNA if present at or below 1.8E+02 copies/mL or 4.9E+02 copies/mL, respectively, and may lead to false negative influenza virus results. If co-infection with influenza A or influenza B virus is suspected in samples with a positive SARS-CoV-2 result, the sample should be re-tested with another FDA cleared, approved, or authorized influenza test, if influenza virus detection would change clinical management.    Myoglobin, Serum [[id number]]  (Normal) Collected: 01/15/22 1245    Specimen: Blood Updated: 01/15/22 1556     Myoglobin 52.1 ng/mL     Narrative:      Results may be falsely decreased if patient taking Biotin.      COVID-19, FLU A/B, RSV PCR - Swab, Nasopharynx [[id number]]  (Abnormal) Collected: 01/15/22 1249    Specimen: Swab from Nasopharynx Updated: 01/15/22 1400     COVID19  Detected     Influenza A PCR Not Detected     Influenza B PCR Not Detected     RSV, PCR Not Detected    Narrative:      Fact sheet for providers: https://www.fda.gov/media/286419/download    Fact sheet for patients: https://www.fda.gov/media/667989/download    Test performed by PCR.    Comprehensive Metabolic Panel [[id number]]  (Abnormal) Collected: 01/15/22 1245    Specimen: Blood Updated: 01/15/22 1310     Glucose 103 mg/dL      BUN 14 mg/dL      Creatinine 0.98 mg/dL      Sodium 139 mmol/L      Potassium 4.2 mmol/L      Comment: Slight hemolysis detected by analyzer. Results may be affected.        Chloride 103 mmol/L      CO2 26.0 mmol/L      Calcium 9.8 mg/dL      Total Protein 7.8 g/dL      Albumin 5.20 g/dL      ALT (SGPT) 21 U/L      AST (SGOT) 20 U/L      Alkaline Phosphatase 63 U/L      Total Bilirubin 0.6 mg/dL      eGFR Non African Amer 90 mL/min/1.73      Globulin 2.6 gm/dL      Comment: Calculated Result        A/G Ratio 2.0 g/dL      BUN/Creatinine Ratio 14.3     Anion Gap 10.0 mmol/L     Narrative:      GFR Normal >60  Chronic Kidney Disease <60  Kidney Failure <15      CK [[id number]]  (Abnormal) Collected: 01/15/22 1245    Specimen: Blood Updated: 01/15/22 1310     Creatine Kinase 292 U/L     Lactic Acid, Plasma [[id number]]  (Normal) Collected: 01/15/22 1245    Specimen: Blood Updated: 01/15/22 1306     Lactate 1.7 mmol/L      Comment: Falsely depressed results may occur on samples drawn from patients receiving N-Acetylcysteine (NAC) or Metamizole.       CBC & Differential [[id number]]  (Abnormal) Collected: 01/15/22 1245    Specimen: Blood Updated: 01/15/22 1253    Narrative:      The following orders were created for panel order CBC & Differential.  Procedure                               Abnormality         Status                     ---------                               -----------         ------                     CBC Auto Differential[[id number]]        Abnormal            Final result                  Please view results for these tests on the individual orders.    CBC Auto Differential [[id number]]  (Abnormal) Collected: 01/15/22 1245    Specimen: Blood Updated: 01/15/22 1253     WBC 12.86 10*3/mm3      RBC 5.28 10*6/mm3      Hemoglobin 15.0 g/dL      Hematocrit 45.6 %      MCV 86.4 fL      MCH 28.4 pg      MCHC 32.9 g/dL      RDW 14.2 %      RDW-SD 45.3 fl      MPV 10.7 fL      Platelets 269 10*3/mm3      Neutrophil % 82.6 %      Lymphocyte % 10.8 %      Monocyte % 5.5 %      Eosinophil % 0.3 %      Basophil % 0.4 %      Immature Grans % 0.4 %      Neutrophils, Absolute 10.62 10*3/mm3      Lymphocytes, Absolute 1.39 10*3/mm3      Monocytes, Absolute 0.71 10*3/mm3      Eosinophils, Absolute 0.04 10*3/mm3      Basophils, Absolute 0.05 10*3/mm3      Immature Grans, Absolute 0.05 10*3/mm3      nRBC 0.0 /100 WBC           Imaging:  XR Tibia Fibula 2 View Left, XR Ankle 3+ View Left, XR Foot 3+ View Left, XR Knee 1 or 2 View Left  Narrative: EXAMINATION: XR TIBIA FIBULA 2 VW LEFT-, XR FOOT 3+ VW LEFT-, XR KNEE 1  OR 2 VW LEFT-, XR ANKLE 3+ VW LEFT-      INDICATION: injury      COMPARISON: NONE     FINDINGS: There is extensive subcutaneous edema and soft tissue  reticulation noted along the medial aspect of the proximal tibia/knee.  Cortical margins are otherwise intact, without evidence of acute  fracture. Small suprapatellar joint effusion is present.     The ankle mortise is symmetric with an intact talar dome. No fracture or  dislocation. Cortical margins of the left foot are maintained without  evidence of fracture, dislocation or suspicious osseous lesion. There is  no evidence of subluxation or dislocation.     Impression: Fairly extensive superficial soft tissue edema is noted  along the medial aspect of the left knee and proximal tibia, with small  suprapatellar joint effusion also noted. The remainder of the imaged  left lower extremity otherwise demonstrates no evidence of acute osseous  or  articular abnormality.     This report was finalized on 1/15/2022 12:46 PM by Yordan Melendez.          Physical Exam:  The left fasciotomy wound VAC has lost its suction seal.  Distally he has minimal swelling.  Foot flexion plantar and dorsiflexion intact.  Sensation intact.  Pulses intact.  He has ecchymosis about the medial knee.  His knee ligament exam is stable.    Assessment/Plan   Postop day 1 status post fasciotomy left lower extremity  Left knee contusion  Continue physical therapy weight-bear as tolerated.  Consult wound care regarding the wound VAC seal  Plan return to the operating room most likely Tuesday for wound washout and closure of the fasciotomy    Chance Zhu MD  01/16/22  09:20 EST            Electronically signed by Chance Zhu MD at 01/16/22 0921          Consult Notes (last 72 hours)      Chance Zhu MD at 01/15/22 1419          Orthopaedic Consult Note    Patient Care Team:  Provider, No Known as PCP - General    Chief complaint  Left leg pain    Subjective .     History of present illness: 31-year-old male who had a crush injury to his left lower leg today.  It happened about 730.  He had his leg stuck between 2 fork[employer].  No previous injury.  He has been vaccinated against COVID with no symptoms.  He tested COVID-positive in the emergency department on screening.  The emergency room physician did a compartment measurement and measured 27 mm and 20 in the superficial and posterior compartments.  Lateral and anterior compartments were less than 5.  Review of Systems  Pertinent items are noted in HPI all other systems are reviewed and are negative    History  History reviewed. No pertinent family history.  Social History     Socioeconomic History   • Marital status: Single   Tobacco Use   • Smoking status: Never Smoker   Substance and Sexual Activity   • Alcohol use: Never   • Drug use: Never     Past Surgical History:   Procedure Laterality Date   • TONSILLECTOMY    "    History reviewed. No pertinent past medical history.  No Known Allergies    Current Facility-Administered Medications:   •  sodium chloride 0.9 % infusion, 125 mL/hr, Intravenous, Continuous, Ortiz Wesley MD, Last Rate: 125 mL/hr at 01/15/22 1254, 125 mL/hr at 01/15/22 1254  No current outpatient medications on file.    Objective     Vital Signs   Temp:  [99.1 °F (37.3 °C)] 99.1 °F (37.3 °C)  Heart Rate:  [98] 98  Resp:  [16] 16  BP: (118-155)/(67-89) 118/67      Physical Exam:     GENERAL APPEARANCE: awake, alert & oriented x 3, in no acute distress and well developed, well nourished  Vitals:    01/15/22 1109 01/15/22 1228 01/15/22 1359   BP: 155/89 130/78 118/67   BP Location: Left arm     Patient Position: Sitting     Pulse: 98     Resp: 16     Temp: 99.1 °F (37.3 °C)     TempSrc: Oral     SpO2: 99% 99% 95%   Weight: 113 kg (250 lb)     Height: 190.5 cm (75\")       PSYCH: normal affect  HEAD: Normocephalic, without obvious abnormality, atraumatic  EYES: No icterus, corneas clear, PERRLA  CARDIOVASCULAR: pulses palpable and equal bilaterally. Capillary refill less than 2 seconds  LUNGS:  breathing nonlabored  ABDOMEN: Soft, nontender, nondistended  BACK: No C-T- L spine tenderness  EXTREMITIES: no clubbing, cyanosis  NEURO: Motor and sensory intact extremities  MUSCULOSKELETAL: He has significant calf swelling in the left lower leg.  Skin is not severely tight but is certainly swollen.  Sensation grossly intact.  Foot motor sensor intact.  Capillary refill less than 3 seconds.  He does have a small area of bleeding where he was stuck for his compartment pressures.    Labs:  Lab Results (last 24 hours)     Procedure Component Value Units Date/Time    COVID-19, FLU A/B, RSV PCR - Swab, Nasopharynx [[id number]]  (Abnormal) Collected: 01/15/22 1249    Specimen: Swab from Nasopharynx Updated: 01/15/22 1400     COVID19 Detected     Influenza A PCR Not Detected     Influenza B PCR Not Detected     RSV, PCR Not " Detected    Narrative:      Fact sheet for providers: https://www.fda.gov/media/104529/download    Fact sheet for patients: https://www.fda.gov/media/753409/download    Test performed by PCR.    Comprehensive Metabolic Panel [[id number]]  (Abnormal) Collected: 01/15/22 1245    Specimen: Blood Updated: 01/15/22 1310     Glucose 103 mg/dL      BUN 14 mg/dL      Creatinine 0.98 mg/dL      Sodium 139 mmol/L      Potassium 4.2 mmol/L      Comment: Slight hemolysis detected by analyzer. Results may be affected.        Chloride 103 mmol/L      CO2 26.0 mmol/L      Calcium 9.8 mg/dL      Total Protein 7.8 g/dL      Albumin 5.20 g/dL      ALT (SGPT) 21 U/L      AST (SGOT) 20 U/L      Alkaline Phosphatase 63 U/L      Total Bilirubin 0.6 mg/dL      eGFR Non African Amer 90 mL/min/1.73      Globulin 2.6 gm/dL      Comment: Calculated Result        A/G Ratio 2.0 g/dL      BUN/Creatinine Ratio 14.3     Anion Gap 10.0 mmol/L     Narrative:      GFR Normal >60  Chronic Kidney Disease <60  Kidney Failure <15      CK [[id number]]  (Abnormal) Collected: 01/15/22 1245    Specimen: Blood Updated: 01/15/22 1310     Creatine Kinase 292 U/L     Lactic Acid, Plasma [[id number]]  (Normal) Collected: 01/15/22 1245    Specimen: Blood Updated: 01/15/22 1306     Lactate 1.7 mmol/L      Comment: Falsely depressed results may occur on samples drawn from patients receiving N-Acetylcysteine (NAC) or Metamizole.       CBC & Differential [[id number]]  (Abnormal) Collected: 01/15/22 1245    Specimen: Blood Updated: 01/15/22 1253    Narrative:      The following orders were created for panel order CBC & Differential.  Procedure                               Abnormality         Status                     ---------                               -----------         ------                     CBC Auto Differential[[id number]]        Abnormal            Final result                 Please view results for these tests on the individual orders.    CBC Auto  Differential [[id number]]  (Abnormal) Collected: 01/15/22 1245    Specimen: Blood Updated: 01/15/22 1253     WBC 12.86 10*3/mm3      RBC 5.28 10*6/mm3      Hemoglobin 15.0 g/dL      Hematocrit 45.6 %      MCV 86.4 fL      MCH 28.4 pg      MCHC 32.9 g/dL      RDW 14.2 %      RDW-SD 45.3 fl      MPV 10.7 fL      Platelets 269 10*3/mm3      Neutrophil % 82.6 %      Lymphocyte % 10.8 %      Monocyte % 5.5 %      Eosinophil % 0.3 %      Basophil % 0.4 %      Immature Grans % 0.4 %      Neutrophils, Absolute 10.62 10*3/mm3      Lymphocytes, Absolute 1.39 10*3/mm3      Monocytes, Absolute 0.71 10*3/mm3      Eosinophils, Absolute 0.04 10*3/mm3      Basophils, Absolute 0.05 10*3/mm3      Immature Grans, Absolute 0.05 10*3/mm3      nRBC 0.0 /100 WBC           Imaging:  I viewed and personally interpreted radiographs of his left tib-fib as negative        Assessment/Plan   Left lower extremity compartment syndrome  The patient is COVID-positive and will be on appropriate precautions    The patient has signs and symptoms of compartment syndrome.  His compartment measurements are borderline and his swelling is significant.  I discussed the risk benefits alternatives surgery and because of the significance of a missed compartment syndrome it would be better to do a fasciotomy to release his compartment.  The risk of surgery are significant less than the risk of a compartment syndrome with permanent neurologic and muscular dysfunction and possible need for future amputation.  All of his questions were answered.  He consents to surgery.  I discussed the patients findings and my recommendations with patient, family and consulting provider    Chance Zhu MD  01/15/22  14:19 EST                Electronically signed by Chance Zhu MD at 01/15/22 1650                 Chance Zhu MD   Physician   Orthopedics   Op Note       Signed   Date of Service:  01/15/22 1605   Creation Time:  01/15/22 1626           Case Time:   Procedures:  Surgeons:    01/15/22 1605 TIBIA FASCIOTOMY    Chance Zhu MD               Signed              Show:Clear all  [x]Manual[x]Template[]Copied    Added by:  [x]Chance Zhu MD      []Darrell for details    OPERATIVE REPORT      DATE OF PROCEDURE: 1/15/2022      SURGEON: Chance Zhu M.D.      STAFF: Circulator: Haase, Sherri L, RN  Scrub Person: Elaina Erickson      PREOPERATIVE DIAGNOSIS: Left lower leg compartment syndrome  POSTOPERATIVE DIAGNOSIS: Left lower leg compartment syndrome of the superficial and deep posterior compartments       Diagnosis is left lower extremity compartment syndrome     Procedure(s):  TIBIA FASCIOTOMY of the left lower extremity     Surgeon(s):  Chance Zhu MD      Circulator: Haase, Sherri L, RN  Scrub Person: Elaina Erickson              SURGICAL DETAILS:      APPROACH: Open     ANESTHESIA: General     PREOPERATIVE ANTIBIOTICS: Ancef     ESTIMATED BLOOD LOSS: 200ml      TOURNIQUET TIME: None      SPECIMENS: * No orders in the log *      IMPLANTS: Nothing was implanted during the procedure       DRAINS: * No LDAs found *     LOCAL INJECTION: None     MODIFIER(S): None     COMPLICATIONS: None        INDICATIONS FOR PROCEDURE: Patient was seen in emergency department with impending compartment syndrome of the posterior superficial and deep compartments.  The risks, benefits, alternatives, and potential complications of the surgery were discussed with the patient in detail to include but not limited to infection, bleeding, anesthesia risks, nerve injury, vessel injury, pain, blood clots, possible need for blood transfusion, possible need for further procedures, myocardial infarction, stroke, and death. Specific risks for this surgery also include permanent nerve and muscle damage. Specific details of the procedure, hospitalization, recovery, rehabilitation, and long-term precautions were also provided. Pre-operative teaching was provided. Surgical device  selection was outlined, and the many options available were explained; final choices will be made at the time of the procedure to match the anatomy and condition of the bone, and soft tissue structures. Understanding of all topics was conveyed to me by the patient, and consent was given to proceed with left lower extremity emergent fasciotomy.      INTRAOPERATIVE FINDINGS: Greater than 200 cc hematoma of the superficial and deep posterior compartments.  Soft anterior and lateral compartments     PROCEDURE: The patient was identified in the preoperative holding area. The operative site was confirmed and marked. A sequential compression device was placed on the nonoperative leg. The risks, benefits, and alternatives to surgery were again confirmed with the patient and the patient wished to proceed. The patient was brought to the operating room and placed on the operating room table in the supine position. A huddle was performed with the patient and all vital surgical team members to confirm the correct operative site, procedure, anesthesia type, and operative plan with the patient. After anesthesia was performed, the patient was positioned in the supine position. All bony prominences were padded.  Intravenous antibiotic prophylaxis was given and confirmed with the anesthesia team. The operative extremity was prepped and draped in the usual sterile fashion. A surgical time out was performed immediately preceding the incision with all personnel in the operating room to confirm patient identity, the correct operative site and extremity, correct radiographic studies, availability of appropriate surgical equipment and agreement on the planned procedure.      An incision was made 2 cm off the medial tibial border.  Greater than 2 mm of hematoma was expressed.  Blunt dissection was performed down to the bone the periosteum and deep compartment was removed off the tibia.  There is some hematoma there also expressed complete  fasciotomy was performed of the superficial and deep compartments.  The anterior lateral compartments were extremely soft and no incision was made laterally.  The muscle did herniate once released and therefore the wound was left open with a wound VAC dressing for stability and decompression.     No apparent complications occurred during the procedure Instrument, sponge and needle counts were correct x 2.      POST OPERATIVE PLAN:   He is to elevate the left lower extremity.  He may weight-bear as tolerated but keep ambulation to minimal  Anticipate return to the OR Monday or Tuesday for irrigation debridement and wound closure of the fasciotomy.  24 h IV antibiotics.

## 2022-01-18 NOTE — PLAN OF CARE
Goal Outcome Evaluation:  Plan of Care Reviewed With: patient        Progress: no change  Outcome Summary: Patient with no acute overnight events. PRN pain meds used sparingly per patient. VSS. Pt NPO since midnight. Continue current POC.

## 2022-01-18 NOTE — THERAPY TREATMENT NOTE
Patient Name: Jagdish Kennedy  : 1990    MRN: [id number]                              Today's Date: 2022       Admit Date: 1/15/2022    Visit Dx:     ICD-10-CM ICD-9-CM   1. Traumatic compartment syndrome of left lower extremity, initial encounter (HCC)  T79.A22A 958.92   2. COVID-19  U07.1 079.89   3. Leukocytosis, unspecified type  D72.829 288.60   4. Elevated CK  R74.8 790.5     Patient Active Problem List   Diagnosis   • Traumatic compartment syndrome of left lower extremity (HCC)   • Lab test positive for detection of COVID-19 virus     History reviewed. No pertinent past medical history.  Past Surgical History:   Procedure Laterality Date   • TONSILLECTOMY        General Information     Row Name 22 1143          OT Time and Intention    Document Type therapy note (daily note)  -     Mode of Treatment individual therapy; occupational therapy  -     Row Name 22 1143          General Information    Patient Profile Reviewed yes  -NATAN     Existing Precautions/Restrictions fall  WBAT LLE, minimal ambulation per ortho, wound vac, plans for sx   -     Row Name 22 1143          Cognition    Orientation Status (Cognition) oriented to; person  other not tested  -     Row Name 22 1143          Safety Issues, Functional Mobility    Impairments Affecting Function (Mobility) endurance/activity tolerance; pain; strength  -           User Key  (r) = Recorded By, (t) = Taken By, (c) = Cosigned By    Initials Name Provider Type    Doris Clark, OT Occupational Therapist                 Mobility/ADL's     Row Name 22 1144          Bed Mobility    Bed Mobility supine-sit; sit-supine; scooting/bridging  -NATAN     Scooting/Bridging [place] (Bed Mobility) modified independence  -NATAN     Supine-Sit [place] (Bed Mobility) modified independence  -NATAN     Sit-Supine [place] (Bed Mobility) modified independence  -NATAN     Bed Mobility, Safety Issues decreased use of  legs for bridging/pushing  -NATAN     Assistive Device (Bed Mobility) head of bed elevated  -NATAN     Comment (Bed Mobility) good mvmt  -NATAN     Row Name 01/18/22 1144          Transfers    Transfers sit-stand transfer  -NATAN     Comment (Transfers) pt. with increased pain and only stood grossly 45 sec  -NATAN     Sit-Stand [place] (Transfers) standby assist  -NATAN     Row Name 01/18/22 1144          Sit-Stand Transfer    Assistive Device (Sit-Stand Transfers) walker, front-wheeled  -NATAN     Row Name 01/18/22 1144          Functional Mobility    Functional Mobility- Ind. Level contact guard assist  -NATAN     Functional Mobility- Device rolling walker  -NATAN     Functional Mobility-Distance (Feet) 2  -NATAN     Functional Mobility- Safety Issues step length decreased  -NATAN     Functional Mobility- Comment pt. not WB through LLE, increased pain  -NATAN     Row Name 01/18/22 1144          Activities of Daily Living    BADL Assessment/Intervention upper body dressing  -NATAN     Row Name 01/18/22 1144          Mobility    Extremity Weight-bearing Status left lower extremity  -NATAN     Left Lower Extremity (Weight-bearing Status) weight-bearing as tolerated (WBAT)   minimal ambulation per ortho  -NATAN     Row Name 01/18/22 1144          Upper Body Dressing Assessment/Training    [place] Level (Upper Body Dressing) doff; don; minimum assist (75% patient effort)  -NATAN     Position (Upper Body Dressing) sitting up in bed  -NATAN     Comment (Upper Body Dressing) gown  -     Row Name 01/18/22 1144          Lower Body Dressing Assessment/Training    Comment (Lower Body Dressing) remains dependent to reach LLE with dressing, will re-assess ability post sx and AE needs  -NATAN           User Key  (r) = Recorded By, (t) = Taken By, (c) = Cosigned By    Initials Name Provider Type    Doris Clark, OT Occupational Therapist               Obj/Interventions     Row Name 01/18/22 1146          Shoulder (Therapeutic Exercise)    Shoulder (Therapeutic  Exercise) strengthening exercise  -     Shoulder Strengthening (Therapeutic Exercise) bilateral; flexion; extension; horizontal aBduction/aDduction; external rotation; blue; 10 repetitions; sitting  5 TE total  -     Row Name 01/18/22 1146          Elbow/Forearm (Therapeutic Exercise)    Elbow/Forearm (Therapeutic Exercise) strengthening exercise  -NATAN     Elbow/Forearm Strengthening (Therapeutic Exercise) bilateral; flexion; extension; sitting; 10 repetitions  -     Row Name 01/18/22 1146          Motor Skills    Motor Skills functional endurance  -     Functional Endurance 02 sats upper 90's on room air  -     Row Name 01/18/22 1146          Balance    Static Sitting Balance WFL; unsupported; sitting, edge of bed  -NATAN     Dynamic Sitting Balance WFL; unsupported; sitting, edge of bed  -NATAN     Static Standing Balance WFL; supported; standing  -NATAN     Dynamic Standing Balance mild impairment; supported; standing  -NATAN     Balance Interventions sit to stand  -NATAN     Comment, Balance pt. did 3 dips using RLE in walker  -     Row Name 01/18/22 1146          Therapeutic Exercise    Therapeutic Exercise shoulder; elbow/forearm  sitting up in bed for all  -NATAN           User Key  (r) = Recorded By, (t) = Taken By, (c) = Cosigned By    Initials Name Provider Type    Doris Clark, OT Occupational Therapist               Goals/Plan     Row Name 01/18/22 1152          Transfer Goal 1 (OT)    Progress/Outcome (Transfer Goal 1, OT) continuing progress toward goal  -NATAN     Row Name 01/18/22 1152          Dressing Goal 1 (OT)    Progress/Outcome (Dressing Goal 1, OT) goal ongoing  -     Row Name 01/18/22 1152          Grooming Goal 1 (OT)    Progress/Outcome (Grooming Goal 1, OT) goal ongoing  -           User Key  (r) = Recorded By, (t) = Taken By, (c) = Cosigned By    Initials Name Provider Type    Doris Clark, OT Occupational Therapist               Clinical Impression     Row Name 01/18/22 1148           Pain Scale: Numbers Pre/Post-Treatment    Pretreatment Pain Rating 2/10  -NATAN     Posttreatment Pain Rating 6/10  -NATAN     Pain Location - Side Left  -NATAN     Pain Location - Orientation lower  -NATAN     Pain Location extremity  -NATAN     Pre/Posttreatment Pain Comment nurse getting pain shot for pt.  -NATAN     Pain Intervention(s) Medication (See MAR); Repositioned  -NATAN     Row Name 01/18/22 1148          Plan of Care Review    Plan of Care Reviewed With patient  -NATAN     Progress improving  -NATAN     Outcome Summary Pt. was able to complete 10 reps x 5 shoulder and 2 distal TE with blue tband without rest period to support ADL and transfer indendence.  Pt. in and out bed MI and SBA sit to stand.  Standing limited by pain and returned supine.  Will re-evaluate need for dressing AE post sx. today.  To HOLD status for surgery and will need new orders.  -     Row Name 01/18/22 1148          Therapy Assessment/Plan (OT)    Therapy Frequency (OT) daily  -[facility] Name 01/18/22 1148          Therapy Plan Review/Discharge Plan (OT)    Anticipated Discharge Disposition (OT) inpatient rehabilitation facility  pending progress post surgery, may be able to discharge home with family support and OP therapy  -     Row Name 01/18/22 1148          Vital Signs    Pre Systolic BP Rehab 132  earlier am  -NATAN     Pre Treatment Diastolic BP 75  -NATAN     Post Systolic BP Rehab 119  -NATAN     Post Treatment Diastolic BP 86  -NATAN     Pretreatment Heart Rate (beats/min) 85  -NATAN     Posttreatment Heart Rate (beats/min) 68  -NATAN     Pre SpO2 (%) 96  -NATAN     O2 Delivery Pre Treatment room air  -NATAN     Post SpO2 (%) 97  -NATAN     O2 Delivery Post Treatment room air  -NATAN     Pre Patient Position Supine  -NATAN     Intra Patient Position Standing  -NATAN     Post Patient Position Supine  -NATAN     Row Name 01/18/22 1148          Positioning and Restraints    Pre-Treatment Position in bed  -NATAN     Post Treatment Position bed  -NATAN     In Bed sitting; exit alarm on;  call light within reach; LLE elevated  wound vac in place  -NATAN           User Key  (r) = Recorded By, (t) = Taken By, (c) = Cosigned By    Initials Name Provider Type    Doris Clark OT Occupational Therapist               Outcome Measures     Row Name 01/18/22 1152          How much help from another is currently needed...    Putting on and taking off regular lower body clothing? 2  -NATAN     Bathing (including washing, rinsing, and drying) 3  -NATAN     Toileting (which includes using toilet bed pan or urinal) 3  -NATAN     Putting on and taking off regular upper body clothing 3  -NATAN     Taking care of personal grooming (such as brushing teeth) 4  sitting  -NATAN     Eating meals 4  -NATAN     AM-PAC 6 Clicks Score (OT) 19  -NATAN     Row Name 01/18/22 0840          How much help from another person do you currently need...    Turning from your back to your side while in flat bed without using bedrails? 4  -CB     Moving from lying on back to sitting on the side of a flat bed without bedrails? 4  -CB     Moving to and from a bed to a chair (including a wheelchair)? 3  -CB     Standing up from a chair using your arms (e.g., wheelchair, bedside chair)? 3  -CB     Climbing 3-5 steps with a railing? 2  -CB     To walk in hospital room? 2  -CB     AM-PAC 6 Clicks Score (PT) 18  -CB     Row Name 01/18/22 1152          Functional Assessment    Outcome Measure Options AM-PAC 6 Clicks Daily Activity (OT)  -NATAN           User Key  (r) = Recorded By, (t) = Taken By, (c) = Cosigned By    Initials Name Provider Type    Doris Clark OT Occupational Therapist    Evon Morales, RN Registered Nurse                Occupational Therapy Education                 Title: PT OT SLP Therapies (Done)     Topic: Occupational Therapy (Done)     Point: ADL training (Done)     Description:   Instruct learner(s) on proper safety adaptation and remediation techniques during self care or transfers.   Instruct in proper use of assistive devices.               Learning Progress Summary           Patient Acceptance, E, VU by MA at 1/16/2022 1015                   Point: Home exercise program (Done)     Description:   Instruct learner(s) on appropriate technique for monitoring, assisting and/or progressing therapeutic exercises/activities.              Learning Progress Summary           Patient Acceptance, E,D, VU,DU by NATAN at 1/18/2022 1152    Comment: UE tband, benefit of use                   Point: Precautions (Done)     Description:   Instruct learner(s) on prescribed precautions during self-care and functional transfers.              Learning Progress Summary           Patient Acceptance, E, VU by MA at 1/16/2022 1015                   Point: Body mechanics (Done)     Description:   Instruct learner(s) on proper positioning and spine alignment during self-care, functional mobility activities and/or exercises.              Learning Progress Summary           Patient Acceptance, E, VU by MA at 1/16/2022 1015                               User Key     Initials Effective Dates Name Provider Type Discipline     06/16/21 -  Doris Bonilla, OT Occupational Therapist OT    MA 11/19/20 -  Ann Marie Palmer OT Occupational Therapist OT              OT Recommendation and Plan  Therapy Frequency (OT): daily  Plan of Care Review  Plan of Care Reviewed With: patient  Progress: improving  Outcome Summary: Pt. was able to complete 10 reps x 5 shoulder and 2 distal TE with blue tband without rest period to support ADL and transfer indendence.  Pt. in and out bed MI and SBA sit to stand.  Standing limited by pain and returned supine.  Will re-evaluate need for dressing AE post sx. today.  To HOLD status for surgery and will need new orders.     Time Calculation:    Time Calculation- OT     Row Name 01/18/22 7593             Time Calculation- OT    OT Start Time 1108  -NATAN      OT Received On 01/18/22  -NATAN      OT Goal Re-Cert Due Date 01/26/22  -NATAN              Timed Charges     52447 - OT Therapeutic Exercise Minutes 22  -NATAN      89879 - OT Therapeutic Activity Minutes 6  -NATAN      12411 - OT Self Care/Mgmt Minutes 4  -NATAN              Total Minutes    Timed Charges Total Minutes 32  -NATAN       Total Minutes 32  -NATAN            User Key  (r) = Recorded By, (t) = Taken By, (c) = Cosigned By    Initials Name Provider Type    Doris Clark, OT Occupational Therapist              Therapy Charges for Today     Code Description Service Date Service Provider Modifiers Qty    06850188922  OT THER PROC EA 15 MIN 1/18/2022 Doris Bonilla OT GO 2               Doris Bonilla OT  1/18/2022

## 2022-01-18 NOTE — PROGRESS NOTES
[facility] Medicine Services  PROGRESS NOTE    Patient Name: Jagdish Kennedy  : 1990  MRN: [id number]    Date of Admission: 1/15/2022  Primary Care Physician: Provider, No Known    Subjective   Subjective     CC:  Leg pain     HPI:  No acute events. Pain with fair control. Denies resp symptoms.     ROS:  Gen- No fevers, chills  CV- No chest pain, palpitations  Resp- No cough, dyspnea  GI- No N/V/D, abd pain     Objective   Objective     Vital Signs:   Temp:  [96.1 °F (35.6 °C)-97.4 °F (36.3 °C)] 96.5 °F (35.8 °C)  Heart Rate:  [67-89] 77  Resp:  [18] 18  BP: (119-139)/(68-86) 119/86     Physical Exam:  Constitutional: No acute distress, awake, alert  HENT: NCAT, mucous membranes moist  Respiratory: Clear to auscultation bilaterally, respiratory effort normal   Cardiovascular: RRR, no murmurs, rubs, or gallops  Gastrointestinal: Positive bowel sounds, soft, nontender, nondistended  Musculoskeletal: left leg with edema above knee and thigh; wound vac in place   Psychiatric: Appropriate affect, cooperative  Neurologic: Oriented x 3, strength symmetric in all extremities, Cranial Nerves grossly intact to confrontation, speech clear  Skin: No rashes    Results Reviewed:  LAB RESULTS:      Lab 22  0719 22  1201 01/15/22  1245   WBC 6.42 8.14 12.86*   HEMOGLOBIN 12.2* 12.2* 15.0   HEMATOCRIT 37.9 37.9 45.6   PLATELETS 205 211 269   NEUTROS ABS  --   --  10.62*   IMMATURE GRANS (ABS)  --   --  0.05   LYMPHS ABS  --   --  1.39   MONOS ABS  --   --  0.71   EOS ABS  --   --  0.04   MCV 88.6 89.0 86.4   LACTATE  --   --  1.7         Lab 22  0719 22  1542 01/15/22  1245   SODIUM 138 139 139   POTASSIUM 4.4 4.5 4.2   CHLORIDE 104 104 103   CO2 27.0 28.0 26.0   ANION GAP 7.0 7.0 10.0   BUN 13 12 14   CREATININE 0.80 1.16 0.98   GLUCOSE 93 102* 103*   CALCIUM 9.2 8.8 9.8         Lab 22  1542 01/15/22  1245   TOTAL PROTEIN 6.1 7.8   ALBUMIN 3.90 5.20   GLOBULIN 2.2  2.6   ALT (SGPT) 14 21   AST (SGOT) 18 20   BILIRUBIN 0.3 0.6   ALK PHOS 53 63                 Lab 01/15/22  1736   ABO TYPING O   RH TYPING Positive   ANTIBODY SCREEN Negative         Brief Urine Lab Results     None          Microbiology Results Abnormal     None          MRI Knee Left Without Contrast    Result Date: 1/17/2022  EXAMINATION: MRI KNEE LEFT  WO CONTRAST- 01/17/2022  INDICATION: Knee instability  TECHNIQUE: Multiplanar MRI of the knee without intravenous contrast  COMPARISON: Knee x-ray 01/15/2022  FINDINGS: Extensor mechanism intact with quadriceps and patellar tendons in normal limits. Subcutaneous edema throughout including prepatellar edema.  Patellofemoral joint space reveals no evidence for high-grade chondromalacia or disruption of the patellar retinaculum with small suprapatellar effusion however no evidence for loose body.  Femorotibial joint spaces reveal preserved distal femoral and proximal tibial plateau cortical margins. ACL and PCL intact. Lateral meniscus intact and medial meniscus intact. MCL intact and unremarkable. Lateral ligamentous complex reveals lateral collateral intact and fibular collateral intact with questionable increased signal involving the distal popliteal tendon likely adjacent or relative fluid signal from adjacent subcutaneous edema without irregularity or disruption.      Impression: Extensive subcutaneous soft tissue edema throughout the visualized soft tissues and field-of-view including prepatellar edema. Trace suprapatellar edema may be secondary to the subcutaneous involvement otherwise no internal derangement.   D:  01/17/2022 E:  01/17/2022  This report was finalized on 1/17/2022 9:39 AM by Dr. Hai Escalante.      Duplex Venous Lower Extremity - Bilateral CAR    Result Date: 1/17/2022  · Normal bilateral lower extremity venous duplex scan.            I have reviewed the medications:  Scheduled Meds:[MAR Hold] enoxaparin, 40 mg, Subcutaneous, Q24H  [MAR Hold]  senna-docusate sodium, 2 tablet, Oral, BID  [MAR Hold] sodium chloride, 10 mL, Intravenous, Q12H      Continuous Infusions:   PRN Meds:.•  [MAR Hold] acetaminophen **OR** [MAR Hold] acetaminophen **OR** [MAR Hold] acetaminophen  •  [MAR Hold] senna-docusate sodium **AND** [MAR Hold] polyethylene glycol **AND** [MAR Hold] bisacodyl **AND** [MAR Hold] bisacodyl  •  [MAR Hold] HYDROcodone-acetaminophen  •  [MAR Hold] HYDROmorphone **AND** [MAR Hold] naloxone  •  [MAR Hold] ondansetron **OR** [MAR Hold] ondansetron  •  [MAR Hold] sodium chloride    Assessment/Plan   Assessment & Plan     Active Hospital Problems    Diagnosis  POA   • **Traumatic compartment syndrome of left lower extremity (HCC) [T79.A22A]  Yes   • Lab test positive for detection of COVID-19 virus [U07.1]  Yes      Resolved Hospital Problems   No resolved problems to display.        Brief Hospital Course to date:  Jagdish Kennedy is a 31 y.o. male fully vaccinated for COVID-19 who presented after traumatic injury to his left leg at work with subsequent pain and increasing swelling. He was admitted for concern of compartment syndrome; he was incidentally and asymptomatically COVID-19 positive in the ED. Patient was taken to the OR 1/15 by Dr. Zhu and fasciotomy performed.     Assessment/plan     Traumatic injury to the left leg  Compartment syndrome of the left calf/leg  - s/p fasciotomy per Dr. Zhu to the LLE (1/15)  - back to OR 1/18 for closure; currently with wound vac but final needs TBD following OR  - Continue PRN pain control; IVF   - Lovenox DVT ppx   - PT/OT/wound care      Asymptomatic COVID-19 positive lab test  - Fully vaccinated for the same  - Repeat COVID test positive.  - doing well on room air    DVT prophylaxis:  Medical and mechanical DVT prophylaxis orders are present.       AM-PAC 6 Clicks Score (PT): 18 (01/18/22 4044)    Disposition: I expect the patient to be discharged TBD; currently with rehab recs     CODE STATUS:    Code Status and Medical Interventions:   Ordered at: 01/15/22 1516     Code Status (Patient has no pulse and is not breathing):    CPR (Attempt to Resuscitate)     Medical Interventions (Patient has pulse or is breathing):    Full Support       Tiffany Mccain DO  01/18/22

## 2022-01-18 NOTE — OP NOTE
OPERATIVE REPORT     DATE OF PROCEDURE: 1/18/2022     SURGEON: Chance Zhu M.D.     STAFF: Circulator: Deric Jones RN  Scrub Person: Jules Schuster     PREOPERATIVE DIAGNOSIS: Traumatic compartment syndrome of left lower extremity, initial encounter ([facility]) [T79.A22A]    POSTOPERATIVE DIAGNOSIS: * No post-op diagnosis entered *       Post-Op Diagnosis Codes:     * Traumatic compartment syndrome of left lower extremity, initial encounter ([facility]) [T79.A22A]    Procedure(s):  WOUND CLOSURE ORTHOPEDIC LEFT LOWER LEG    Surgeon(s):  Chance Zhu MD     Circulator: Deric Jones RN  Scrub Person: Jules Schuster          SURGICAL DETAILS:     APPROACH: Open    ANESTHESIA: General    PREOPERATIVE ANTIBIOTICS: Ancef    ESTIMATED BLOOD LOSS: none     TOURNIQUET TIME: None     SPECIMENS: none    IMPLANTS: Nothing was implanted during the procedure      DRAINS: none    LOCAL INJECTION: none     MODIFIER(S): none     COMPLICATIONS: None       INDICATIONS FOR PROCEDURE: Present the risks, benefits, alternatives, and potential complications of the surgery were discussed with the patient in detail to include but not limited to infection, bleeding, anesthesia risks, nerve injury, vessel injury, pain, blood clots, possible need for blood transfusion, possible need for further procedures, myocardial infarction, stroke, and death. Specific details of the procedure, hospitalization, recovery, rehabilitation, and long-term precautions were also provided. Pre-operative teaching was provided. Surgical device selection was outlined, and the many options available were explained; final choices will be made at the time of the procedure to match the anatomy and condition of the bone, and soft tissue structures. Understanding of all topics was conveyed to me by the patient, and consent was given to proceed with left lower leg fasciotomy wound closure.     INTRAOPERATIVE FINDINGS: Minimal swelling.  Wound was easily  closed    PROCEDURE: The patient was identified in the preoperative holding area. The operative site was confirmed and marked. A sequential compression device was placed on the nonoperative leg. The risks, benefits, and alternatives to surgery were again confirmed with the patient and the patient wished to proceed. The patient was brought to the operating room and placed on the operating room table in the supine position. A huddle was performed with the patient and all vital surgical team members to confirm the correct operative site, procedure, anesthesia type, and operative plan with the patient. After anesthesia was performed, the patient was positioned in the supine position. All bony prominences were padded. Intravenous antibiotic prophylaxis was given and confirmed with the anesthesia team. The operative extremity was prepped and draped in the usual sterile fashion. A surgical time out was performed immediately preceding the incision with all personnel in the operating room to confirm patient identity, the correct operative site and extremity, correct radiographic studies, availability of appropriate surgical equipment and agreement on the planned procedure.     The wound was irrigated.  The wound was then closed with Vicryl and 3-0 strata fix suture.  Skin glue dressing was applied.    No apparent complications occurred during the procedure Instrument, sponge and needle counts were correct x 2.     POST OPERATIVE PLAN:   Weight Bearing: As tolerated  DVT prophylaxis: May convert to aspirin daily  Therapy/Activity: Up ad juan a.  Wound Care: Remove dressing on postoperative day 7  Pain control: P.o. pain meds  He may be discharged home today or tomorrow morning  Follow up: 1 week

## 2022-01-18 NOTE — PLAN OF CARE
Goal Outcome Evaluation:  Plan of Care Reviewed With: patient        Progress: improving  Outcome Summary: Pt. was able to complete 10 reps x 5 shoulder and 2 distal TE with blue tband without rest period to support ADL and transfer indendence.  Pt. in and out bed MI and SBA sit to stand.  Standing limited by pain and returned supine.  Will re-evaluate need for dressing AE post sx. today.  To HOLD status for surgery and will need new orders.

## 2022-01-18 NOTE — ANESTHESIA PREPROCEDURE EVALUATION
Anesthesia Evaluation     Patient summary reviewed and Nursing notes reviewed   no history of anesthetic complications:  NPO Solid Status: > 8 hours  NPO Liquid Status: > 8 hours           Airway   Mallampati: II  TM distance: >3 FB  Neck ROM: full  No difficulty expected  Dental      Pulmonary - negative pulmonary ROS and normal exam   Cardiovascular - negative cardio ROS and normal exam        Neuro/Psych- negative ROS  GI/Hepatic/Renal/Endo - negative ROS     Musculoskeletal     Abdominal    Substance History      OB/GYN          Other                        Anesthesia Plan    ASA 2     general     intravenous induction     Anesthetic plan, all risks, benefits, and alternatives have been provided, discussed and informed consent has been obtained with: patient.    Plan discussed with CRNA.        CODE STATUS:    Code Status (Patient has no pulse and is not breathing): CPR (Attempt to Resuscitate)  Medical Interventions (Patient has pulse or is breathing): Full Support

## 2022-01-19 ENCOUNTER — READMISSION MANAGEMENT (OUTPATIENT)
Dept: CALL CENTER | Facility: HOSPITAL | Age: 32
End: 2022-01-19

## 2022-01-19 VITALS
TEMPERATURE: 96.9 F | SYSTOLIC BLOOD PRESSURE: 129 MMHG | BODY MASS INDEX: 31.08 KG/M2 | OXYGEN SATURATION: 96 % | HEIGHT: 75 IN | WEIGHT: 250 LBS | RESPIRATION RATE: 18 BRPM | DIASTOLIC BLOOD PRESSURE: 82 MMHG | HEART RATE: 93 BPM

## 2022-01-19 LAB
ANION GAP SERPL CALCULATED.3IONS-SCNC: 11 MMOL/L (ref 5–15)
BUN SERPL-MCNC: 14 MG/DL (ref 6–20)
BUN/CREAT SERPL: 20.3 (ref 7–25)
CALCIUM SPEC-SCNC: 9.4 MG/DL (ref 8.6–10.5)
CHLORIDE SERPL-SCNC: 103 MMOL/L (ref 98–107)
CO2 SERPL-SCNC: 24 MMOL/L (ref 22–29)
CREAT SERPL-MCNC: 0.69 MG/DL (ref 0.76–1.27)
DEPRECATED RDW RBC AUTO: 43.5 FL (ref 37–54)
ERYTHROCYTE [DISTWIDTH] IN BLOOD BY AUTOMATED COUNT: 13.5 % (ref 12.3–15.4)
GFR SERPL CREATININE-BSD FRML MDRD: 135 ML/MIN/1.73
GLUCOSE SERPL-MCNC: 112 MG/DL (ref 65–99)
HCT VFR BLD AUTO: 38 % (ref 37.5–51)
HGB BLD-MCNC: 12.4 G/DL (ref 13–17.7)
MCH RBC QN AUTO: 28.6 PG (ref 26.6–33)
MCHC RBC AUTO-ENTMCNC: 32.6 G/DL (ref 31.5–35.7)
MCV RBC AUTO: 87.8 FL (ref 79–97)
PLATELET # BLD AUTO: 243 10*3/MM3 (ref 140–450)
PMV BLD AUTO: 11.3 FL (ref 6–12)
POTASSIUM SERPL-SCNC: 4 MMOL/L (ref 3.5–5.2)
RBC # BLD AUTO: 4.33 10*6/MM3 (ref 4.14–5.8)
SODIUM SERPL-SCNC: 138 MMOL/L (ref 136–145)
WBC NRBC COR # BLD: 10.4 10*3/MM3 (ref 3.4–10.8)

## 2022-01-19 PROCEDURE — 97535 SELF CARE MNGMENT TRAINING: CPT

## 2022-01-19 PROCEDURE — 99239 HOSP IP/OBS DSCHRG MGMT >30: CPT | Performed by: INTERNAL MEDICINE

## 2022-01-19 PROCEDURE — 80048 BASIC METABOLIC PNL TOTAL CA: CPT | Performed by: ORTHOPAEDIC SURGERY

## 2022-01-19 PROCEDURE — 97164 PT RE-EVAL EST PLAN CARE: CPT

## 2022-01-19 PROCEDURE — 85027 COMPLETE CBC AUTOMATED: CPT | Performed by: ORTHOPAEDIC SURGERY

## 2022-01-19 PROCEDURE — 97168 OT RE-EVAL EST PLAN CARE: CPT

## 2022-01-19 PROCEDURE — 97116 GAIT TRAINING THERAPY: CPT

## 2022-01-19 RX ORDER — ASPIRIN 81 MG/1
81 TABLET ORAL DAILY
Qty: 30 TABLET | Refills: 0 | Status: SHIPPED | OUTPATIENT
Start: 2022-01-19 | End: 2022-02-28

## 2022-01-19 RX ORDER — HYDROCODONE BITARTRATE AND ACETAMINOPHEN 5; 325 MG/1; MG/1
1 TABLET ORAL EVERY 4 HOURS PRN
Qty: 30 TABLET | Refills: 0 | Status: SHIPPED | OUTPATIENT
Start: 2022-01-19 | End: 2022-01-24

## 2022-01-19 RX ORDER — AMOXICILLIN 250 MG
2 CAPSULE ORAL 2 TIMES DAILY PRN
Qty: 30 TABLET | Refills: 0 | Status: SHIPPED | OUTPATIENT
Start: 2022-01-19 | End: 2022-02-28

## 2022-01-19 RX ADMIN — SENNOSIDES AND DOCUSATE SODIUM 2 TABLET: 50; 8.6 TABLET ORAL at 09:09

## 2022-01-19 RX ADMIN — SODIUM CHLORIDE, PRESERVATIVE FREE 10 ML: 5 INJECTION INTRAVENOUS at 09:08

## 2022-01-19 NOTE — THERAPY RE-EVALUATION
Patient Name: Jagdish Kennedy  : 1990    MRN: [id number]                              Today's Date: 2022       Admit Date: 1/15/2022    Visit Dx:     ICD-10-CM ICD-9-CM   1. Traumatic compartment syndrome of left lower extremity, initial encounter (HCC)  T79.A22A 958.92   2. COVID-19  U07.1 079.89   3. Leukocytosis, unspecified type  D72.829 288.60   4. Elevated CK  R74.8 790.5     Patient Active Problem List   Diagnosis   • Traumatic compartment syndrome of left lower extremity (HCC)   • Lab test positive for detection of COVID-19 virus     History reviewed. No pertinent past medical history.  Past Surgical History:   Procedure Laterality Date   • TIBIA FASCIOTOMY Left 1/15/2022    Procedure: TIBIA FASCIOTOMY LEFT;  Surgeon: Chance Zhu MD;  Location:  [facility];  Service: Orthopedics;  Laterality: Left;   • TONSILLECTOMY     • WOUND CLOSURE Left 2022    Procedure: WOUND CLOSURE ORTHOPEDIC LEFT LOWER LEG;  Surgeon: Chance Zhu MD;  Location:  [facility];  Service: Orthopedics;  Laterality: Left;      General Information     Row Name 22 1332          Physical Therapy Time and Intention    Document Type re-evaluation  -NS     Mode of Treatment physical therapy  -NS     Row Name 22 1332          General Information    Patient Profile Reviewed yes  -NS     Prior Level of Function --  Please see IE  -NS     Existing Precautions/Restrictions fall; left; weight bearing  WBAT LLE  -NS     Barriers to Rehab medically complex  -NS     Row Name 22 1332          Home Main Entrance    Number of Stairs, Main Entrance --  Please see IE  -NS     Row Name 22 1332          Stairs Within Home, Primary    Number of Stairs, Within Home, Primary --  Please see IE  -NS     Row Name 22 1332          Cognition    Orientation Status (Cognition) oriented x 3  -NS     Row Name 22 1332          Safety Issues, Functional Mobility    Safety Issues Affecting Function (Mobility)  sequencing abilities  -NS     Impairments Affecting Function (Mobility) balance; pain  -NS           User Key  (r) = Recorded By, (t) = Taken By, (c) = Cosigned By    Initials Name Provider Type    Marely Trotter, PT Physical Therapist               Mobility     Row Name 01/19/22 1332          Bed Mobility    Bed Mobility supine-sit  -NS     Supine-Sit [place] (Bed Mobility) modified independence  -NS     Row Name 01/19/22 1332          Transfers    Comment (Transfers) VCs for hand placement. Pt mildly unsteady when standing quickly, VCs for completing slowly for increased safety.  -NS     Row Name 01/19/22 1332          Sit-Stand Transfer    Sit-Stand [place] (Transfers) contact guard; verbal cues  -NS     Assistive Device (Sit-Stand Transfers) walker, front-wheeled  -NS     Row Name 01/19/22 1332          Gait/Stairs (Locomotion)    [place] Level (Gait) contact guard; verbal cues  -NS     Assistive Device (Gait) walker, front-wheeled  -NS     Distance in Feet (Gait) 30+70+70  -NS     Deviations/Abnormal Patterns (Gait) gait speed decreased  -NS     Bilateral Gait Deviations forward flexed posture  -NS     Right Sided Gait Deviations heel strike decreased  -NS     [place] Level (Stairs) contact guard  -NS     Handrail Location (Stairs) both sides  -NS     Number of Steps (Stairs) 12  -NS     Ascending Technique (Stairs) bumping  -NS     Descending Technique (Stairs) step-to-step  -NS     Comment (Gait/Stairs) Patient ambulated a lap around the room and then to/from the stairwell. He required cues for safety with turns. Pt primarily did not bear any weight throughout LLE due to pain, occasionally placing L toes down for balance. He ascended 12 steps via scooting up on his bottom, requiring Min A x2 to stand up from ground. He hopped down 12 steps with B hand rails (present at his apartment as well). Pt educated about having family assist him and using gait belt at home.  -NS     Row Name  01/19/22 1332          Mobility    Extremity Weight-bearing Status left upper extremity  -NS     Left Lower Extremity (Weight-bearing Status) weight-bearing as tolerated (WBAT)  -NS           User Key  (r) = Recorded By, (t) = Taken By, (c) = Cosigned By    Initials Name Provider Type    Marely Trotter PT Physical Therapist               Obj/Interventions     Row Name 01/19/22 1332          Range of Motion Comprehensive    General Range of Motion bilateral lower extremity ROM WFL  -NS     Row Name 01/19/22 1332          Strength Comprehensive (MMT)    General Manual Muscle Testing (MMT) Assessment lower extremity strength deficits identified  -NS     Comment, General Manual Muscle Testing (MMT) Assessment LLE not formally tested due to pain. RLE: grossly 5/5  -NS     Row Name 01/19/22 1332          Balance    Balance Assessment sitting static balance; sitting dynamic balance; standing static balance; standing dynamic balance  -NS     Static Sitting Balance WFL; unsupported; sitting, edge of bed  -NS     Dynamic Sitting Balance WFL; unsupported; sitting, edge of bed  -NS     Static Standing Balance mild impairment; supported; standing  -NS     Dynamic Standing Balance mild impairment; supported; standing  -NS           User Key  (r) = Recorded By, (t) = Taken By, (c) = Cosigned By    Initials Name Provider Type    Marely Trotter PT Physical Therapist               Goals/Plan     Row Name 01/19/22 1332          Bed Mobility Goal 1 (PT)    Activity/Assistive Device (Bed Mobility Goal 1, PT) bed mobility activities, all  -NS     [place] Level/Cues Needed (Bed Mobility Goal 1, PT) independent  -NS     Time Frame (Bed Mobility Goal 1, PT) 10 days  -NS     Progress/Outcomes (Bed Mobility Goal 1, PT) goal ongoing  -NS     Row Name 01/19/22 1332          Transfer Goal 1 (PT)    Activity/Assistive Device (Transfer Goal 1, PT) sit-to-stand/stand-to-sit; bed-to-chair/chair-to-bed  -NS     [place] Level/Cues  Needed (Transfer Goal 1, PT) modified independence  -NS     Time Frame (Transfer Goal 1, PT) 10 days  -NS     Progress/Outcome (Transfer Goal 1, PT) goal revised this date  -NS     Row Name 01/19/22 1332          Gait Training Goal 1 (PT)    Activity/Assistive Device (Gait Training Goal 1, PT) gait (walking locomotion); assistive device use  -NS     [place] Level (Gait Training Goal 1, PT) modified independence  -NS     Distance (Gait Training Goal 1, PT) 200  -NS     Time Frame (Gait Training Goal 1, PT) 10 days  -NS     Progress/Outcome (Gait Training Goal 1, PT) goal revised this date  -NS     Row Name 01/19/22 1332          Stairs Goal 1 (PT)    Activity/Assistive Device (Stairs Goal 1, PT) ascending stairs; descending stairs  -NS     [place] Level/Cues Needed (Stairs Goal 1, PT) supervision required  -NS     Number of Stairs (Stairs Goal 1, PT) 3 x 12  -NS     Time Frame (Stairs Goal 1, PT) 10 days  -NS     Progress/Outcome (Stairs Goal 1, PT) goal revised this date  -NS           User Key  (r) = Recorded By, (t) = Taken By, (c) = Cosigned By    Initials Name Provider Type    NS Marely Lopez, PT Physical Therapist               Clinical Impression     Row Name 01/19/22 3968          Pain    Additional Documentation Pain Scale: Numbers Pre/Post-Treatment (Group)  -NS     Row Name 01/19/22 7660          Pain Scale: Numbers Pre/Post-Treatment    Pretreatment Pain Rating 3/10  -NS     Posttreatment Pain Rating 6/10  -NS     Pain Location - Side Left  -NS     Pain Location - Orientation lower  -NS     Pain Location extremity  -NS     Pain Intervention(s) Repositioned  -NS     Row Name 01/19/22 6507          Plan of Care Review    Plan of Care Reviewed With patient  -NS     Progress improving  -NS     Outcome Summary PT Re-eval complete. Patient presents with deficits in balance and pain affecting independence with mobility. He ambulated 70ft+70ft with RW and CGA, demonstrating unsteadiness only when  moving too quickly. Pt primarily avoiding bearing weight through LLE due to pain, occasionally completing gait with toe touch weight bearing. He practiced navigating a flight of stairs via bumping up on his buttocks, then hopping step-to-step to descend. Pt educated about safety with stairs, use of gait belt, and having family assist. Skilled IP PT warranted. Recommend home with family assist, RW, and OP PT at discharge.  -NS     Row Name 01/19/22 0382          Therapy Assessment/Plan (PT)    Patient/Family Therapy Goals Statement (PT) To go home  -NS     Rehab Potential (PT) good, to achieve stated therapy goals  -NS     Criteria for Skilled Interventions Met (PT) yes; meets criteria  -NS     Predicted Duration of Therapy Intervention (PT) 10 days  -NS     Row Name 01/19/22 1332          Vital Signs    Pre Systolic BP Rehab 129  -NS     Pre Treatment Diastolic BP 82  -NS     Post Systolic BP Rehab 138  -NS     Post Treatment Diastolic BP 93  -NS     Pretreatment Heart Rate (beats/min) 96  -NS     Intratreatment Heart Rate (beats/min) 140  RN notified  -NS     Posttreatment Heart Rate (beats/min) 93  -NS     Pre SpO2 (%) 99  -NS     O2 Delivery Pre Treatment room air  -NS     Post SpO2 (%) 96  -NS     O2 Delivery Post Treatment room air  -NS     Pre Patient Position Supine  -NS     Intra Patient Position Standing  -NS     Post Patient Position Sitting  -NS     Row Name 01/19/22 1332          Positioning and Restraints    Pre-Treatment Position in bed  -NS     Post Treatment Position chair  -NS     In Chair notified nsg; sitting; call light within reach; encouraged to call for assist  RN deferred alarm, pt declined elevating LE.  -NS           User Key  (r) = Recorded By, (t) = Taken By, (c) = Cosigned By    Initials Name Provider Type    Marely Trotter, PT Physical Therapist               Outcome Measures     Row Name 01/19/22 0855          How much help from another person do you currently need...    Turning from  your back to your side while in flat bed without using bedrails? 4  -VB     Moving from lying on back to sitting on the side of a flat bed without bedrails? 4  -VB     Moving to and from a bed to a chair (including a wheelchair)? 3  -VB     Standing up from a chair using your arms (e.g., wheelchair, bedside chair)? 3  -VB     Climbing 3-5 steps with a railing? 2  -VB     To walk in hospital room? 2  -VB     AM-PAC 6 Clicks Score (PT) 18  -VB     Row Name 01/19/22 1523          Functional Assessment    Outcome Measure Options AM-PAC 6 Clicks Daily Activity (OT)  -NATAN           User Key  (r) = Recorded By, (t) = Taken By, (c) = Cosigned By    Initials Name Provider Type    Doris Clark, OT Occupational Therapist    Ludy Kolb, RN Registered Nurse                             Physical Therapy Education                 Title: PT OT SLP Therapies (Done)     Topic: Physical Therapy (Done)     Point: Mobility training (Done)     Learning Progress Summary           Patient Acceptance, E, VU by NS at 1/19/2022 1541    MATTHIAS BallD, VU by MB at 1/16/2022 1020                   Point: Home exercise program (Done)     Learning Progress Summary           Patient Acceptance, E, VU by NS at 1/19/2022 1541    MATTHIAS BallD, VU by MB at 1/16/2022 1020                   Point: Body mechanics (Done)     Learning Progress Summary           Patient Acceptance, E, VU by NS at 1/19/2022 1541    MATTHIAS BallD, VU by MB at 1/16/2022 1020                   Point: Precautions (Done)     Learning Progress Summary           Patient Acceptance, E, VU by NS at 1/19/2022 1541    MATTHIAS BallD, VU by MB at 1/16/2022 1020                               User Key     Initials Effective Dates Name Provider Type Discipline    MB 06/16/21 -  Geri Mejia PT Physical Therapist PT    NS 06/16/21 -  Marely Lopez PT Physical Therapist PT              PT Recommendation and Plan  Planned Therapy Interventions (PT): balance training, bed mobility  training, gait training, home exercise program, neuromuscular re-education, patient/family education, strengthening, transfer training  Plan of Care Reviewed With: patient  Progress: improving  Outcome Summary: PT Re-eval complete. Patient presents with deficits in balance and pain affecting independence with mobility. He ambulated 70ft+70ft with RW and CGA, demonstrating unsteadiness only when moving too quickly. Pt primarily avoiding bearing weight through LLE due to pain, occasionally completing gait with toe touch weight bearing. He practiced navigating a flight of stairs via bumping up on his buttocks, then hopping step-to-step to descend. Pt educated about safety with stairs, use of gait belt, and having family assist. Skilled IP PT warranted. Recommend home with family assist, RW, and OP PT at discharge.     Time Calculation:    PT Charges     Row Name 01/19/22 1332             Time Calculation    Start Time 1332  -NS      PT Received On 01/19/22  -NS      PT Goal Re-Cert Due Date 01/29/22  -NS              Timed Charges    48765 - Gait Training Minutes  30  -NS              Untimed Charges    PT Eval/Re-eval Minutes 25  -NS              Total Minutes    Timed Charges Total Minutes 30  -NS      Untimed Charges Total Minutes 25  -NS       Total Minutes 55  -NS            User Key  (r) = Recorded By, (t) = Taken By, (c) = Cosigned By    Initials Name Provider Type    Marely Trotter PT Physical Therapist              Therapy Charges for Today     Code Description Service Date Service Provider Modifiers Qty    [id number]  GAIT TRAINING EA 15 MIN 1/19/2022 Marely Lopez, PT GP 2    [id number]  PT RE-EVAL ESTABLISHED PLAN 2 1/19/2022 Marely Lopez, PT GP 1          PT G-Codes  Outcome Measure Options: AM-PAC 6 Clicks Daily Activity (OT)  AM-PAC 6 Clicks Score (PT): 18  AM-PAC 6 Clicks Score (OT): 23    Marely Lopez PT  1/19/2022

## 2022-01-19 NOTE — CASE MANAGEMENT/SOCIAL WORK
Case Management Discharge Note      Final Note: Patient has discharge orders.  Per PT will need [facility] Walker.  Spoke with patient via telephone who is agreeable to using [employer], RW delivered to RN for patient to take home.  Spoke to Par[facility] with [facility] who accepted patient for RW.  Patient plan is to discharge home today via car with family to transport.    Provided Post Acute Provider List?: N/A  N/A Provider List Comment: denies    Selected Continued Care - Admitted Since 1/15/2022     Destination    No services have been selected for the patient.              Durable Medical Equipment Coordination complete.    Service Provider Selected Services Address Phone Fax Patient Preferred    [facility] [place]  [facility] [address], [place] [id number] [phone] --          Dialysis/Infusion    No services have been selected for the patient.              Home Medical Care    No services have been selected for the patient.              Therapy    No services have been selected for the patient.              Community Resources    No services have been selected for the patient.              Community & DME    No services have been selected for the patient.                       Final Discharge Disposition Code: 01 - home or self-care

## 2022-01-19 NOTE — CASE MANAGEMENT/SOCIAL WORK
Continued Stay Note  [facility]     Patient Name: Jagdish Kennedy  MRN: [id number]  Today's Date: 1/19/2022    Admit Date: 1/15/2022     Discharge Plan     Row Name 01/19/22 1015       Plan    Plan update    Patient/Family in Agreement with Plan yes    Plan Comments Per MDR, need PT/OT to evaluate due to having three flight of stairs to climb to apartment.  Orders placed.  Spoke with patient via telephone regarding discharge plan.  Patient reports he has not been up moving around and is not sure at this time whether or not he would be able to get up the stairs.  CM advised PT/OT would see him.  No other discharge needs verbalized.  CM following.  Patient plan is to discharge home pending eval by PT/OT.    Final Discharge Disposition Code 01 - home or self-care               Discharge Codes    No documentation.               Expected Discharge Date and Time     Expected Discharge Date Expected Discharge Time    Jan 21, 2022             Ann Marie Carrillo RN

## 2022-01-19 NOTE — THERAPY RE-EVALUATION
Patient Name: Jagdish Kennedy  : 1990    MRN: [id number]                              Today's Date: 2022       Admit Date: 1/15/2022    Visit Dx:     ICD-10-CM ICD-9-CM   1. Traumatic compartment syndrome of left lower extremity, initial encounter (HCC)  T79.A22A 958.92   2. COVID-19  U07.1 079.89   3. Leukocytosis, unspecified type  D72.829 288.60   4. Elevated CK  R74.8 790.5     Patient Active Problem List   Diagnosis   • Traumatic compartment syndrome of left lower extremity (HCC)   • Lab test positive for detection of COVID-19 virus     History reviewed. No pertinent past medical history.  Past Surgical History:   Procedure Laterality Date   • TIBIA FASCIOTOMY Left 1/15/2022    Procedure: TIBIA FASCIOTOMY LEFT;  Surgeon: Chance Zhu MD;  Location:  [facility];  Service: Orthopedics;  Laterality: Left;   • TONSILLECTOMY     • WOUND CLOSURE Left 2022    Procedure: WOUND CLOSURE ORTHOPEDIC LEFT LOWER LEG;  Surgeon: Chance Zhu MD;  Location:  [facility];  Service: Orthopedics;  Laterality: Left;      General Information     Row Name 22 1503          OT Time and Intention    Document Type re-evaluation; discharge evaluation/summary  -NATAN     Mode of Treatment occupational therapy  -     Row Name 22 1503          General Information    Patient Profile Reviewed yes  -NATAN     Existing Precautions/Restrictions fall  WBAT LLE  -NTAAN     Barriers to Rehab medically complex  -     Row Name 22 1503          Living Environment    Lives With --  pt. reports someone with be with him at times for 1-2 weeks  -NATAN     Row Name 22 1503          Cognition    Orientation Status (Cognition) oriented to; person  -NATAN     Row Name 22 1503          Safety Issues, Functional Mobility    Impairments Affecting Function (Mobility) balance; pain  -NATAN           User Key  (r) = Recorded By, (t) = Taken By, (c) = Cosigned By    Initials Name Provider Type    Doris Clark, OT  Occupational Therapist                 Mobility/ADL's     [facility] Name 01/19/22 1504          Bed Mobility    Scooting/Bridging [place] (Bed Mobility) modified independence  -     Supine-Sit [place] (Bed Mobility) modified independence  -     Bed Mobility, Safety Issues decreased use of legs for bridging/pushing  -     Assistive Device (Bed Mobility) head of bed elevated  -[facility] Name 01/19/22 1504          Transfers    Transfers sit-stand transfer  -     Comment (Transfers) Pt. stood from EOB a couple time and also from couch.  When pt. rushed mvmt would become unsteady and need education to slow mvmt and then could be balanced moving to stand and to sit.  -     Sit-Stand [place] (Transfers) contact guard  -[facility] Name 01/19/22 1504          Sit-Stand Transfer    Assistive Device (Sit-Stand Transfers) walker, front-wheeled  -[facility] Name 01/19/22 1504          Functional Mobility    Functional Mobility- Ind. Level contact guard assist  -     Functional Mobility-Distance (Feet) --  Pt. was able to use walker to move to stairwall and back to room and also go up 12 steps on buttocks using UE's and then use UE's on ralling and go down 12 step using RLE.  CGA down, SBA up. Had to use railing to transition from ground up and down.  -     Functional Mobility- Safety Issues step length decreased  -     Functional Mobility- Comment Pt. reporting to much pain and only resting L toes on ground using walker.  -[facility] Name 01/19/22 1504          Activities of Daily Living    BADL Assessment/Intervention lower body dressing; toileting; bathing  -[facility] Name 01/19/22 1504          Mobility    Extremity Weight-bearing Status left upper extremity  -     Left Lower Extremity (Weight-bearing Status) weight-bearing as tolerated (WBAT)  -[facility] Name 01/19/22 1504          Lower Body Dressing Assessment/Training    [place] Level (Lower Body Dressing) don; socks; pants/bottoms;  supervision; set up  -NATAN     Position (Lower Body Dressing) unsupported sitting; supported standing  -NATAN     Comment (Lower Body Dressing) pt. when trying to pull up pants trying to do before steady and had to sit back on bed, cues to slow pace and always make sure balanced prior to attempting to pull up pants or other garments  -     Row Name 01/19/22 1504          Toileting Assessment/Training    Comment (Toileting) pt. report he has been going to bathroom on own and using grab bar and that sink at home will simulate grab bar.  -     Row Name 01/19/22 1504          Bathing Assessment/Intervention    Comment (Bathing) mother to bring pt. shower chair per report, educated pt. on need to get instructions for bathing and protecting LLE sx area  -NATAN           User Key  (r) = Recorded By, (t) = Taken By, (c) = Cosigned By    Initials Name Provider Type    Doris Clark OT Occupational Therapist               Obj/Interventions     Row Name 01/19/22 1511          Sensory Assessment (Somatosensory)    Sensory Assessment (Somatosensory) UE sensation intact  -     Row Name 01/19/22 1511          Motor Skills    Functional Endurance 02 stable, increased HR with stair completion  -     Row Name 01/19/22 1511          Balance    Static Sitting Balance WFL; unsupported; sitting, edge of bed; sitting in chair  -NATAN     Dynamic Sitting Balance WFL; unsupported; sitting in chair; sitting, edge of bed  pt. demonstrating tricep drop with UE's on couch to simulate could attempt steps  -NATAN     Static Standing Balance supported; standing; mild impairment  -NATAN     Dynamic Standing Balance mild impairment; supported; standing  -NATAN     Balance Interventions sit to stand; occupation based/functional task  -NATAN     Comment, Balance pt. good balance when slowed pace, but unsteady when moved to quickly  -NATAN           User Key  (r) = Recorded By, (t) = Taken By, (c) = Cosigned By    Initials Name Provider Type    Doris Clark,  OT Occupational Therapist               Goals/Plan     [facility] Name 01/19/22 1522          Transfer Goal 1 (OT)    Progress/Outcome (Transfer Goal 1, OT) goal met  met with demonstration and/or simulation  -[facility] Name 01/19/22 1522          Dressing Goal 1 (OT)    Progress/Outcome (Dressing Goal 1, OT) goal met  -[facility] Name 01/19/22 1522          Grooming Goal 1 (OT)    Progress/Outcome (Grooming Goal 1, OT) goal met  simulated skills for completion, not needing to complete at this time  -           User Key  (r) = Recorded By, (t) = Taken By, (c) = Cosigned By    Initials Name Provider Type    NATAN Doris Bonilla, OT Occupational Therapist               Clinical Impression     [facility] Name 01/19/22 1514          Pain Scale: Numbers Pre/Post-Treatment    Pretreatment Pain Rating 3/10  -NATAN     Posttreatment Pain Rating 6/10  -NATAN     Pain Location - Side Left  -NATAN     Pain Location - Orientation lower  -NATAN     Pain Location extremity  -NATAN     Pain Intervention(s) Repositioned  -[facility] Name 01/19/22 1514          Plan of Care Review    Plan of Care Reviewed With patient  -NATAN     Progress improving  -NATAN     Outcome Summary Re-evaluation completed with pt. demonstrating ability to dress self post setup once instructed to slow pace of mvmt.  Pt. reports toileting on own using grab bar, sink beside toilet at home per pt. report. Pt.  only able to tolerate toe touch down LLE at this time. Good progess to all goals.  Recommend home at discharge with family assist, wx recommended and OP PT therapy.  No further skilled OT services needed.  -[facility] Name 01/19/22 1514          Therapy Plan Review/Discharge Plan (OT)    Anticipated Discharge Disposition (OT) home with assist; home with outpatient therapy services  -[facility] Name 01/19/22 1514          Vital Signs    Pre Systolic BP Rehab 129  -NATAN     Pre Treatment Diastolic BP 82  -NATAN     Pretreatment Heart Rate (beats/min) 96  -NATAN     Intratreatment Heart Rate  (beats/min) 139   -NATAN     Posttreatment Heart Rate (beats/min) 89  -NATAN     Pre SpO2 (%) 99  -NATAN     O2 Delivery Pre Treatment room air  -NATAN     Post SpO2 (%) 96  -NATAN     O2 Delivery Post Treatment room air  -NATAN     Pre Patient Position Supine  -NATAN     Intra Patient Position Standing  -NATAN     Post Patient Position Sitting  -NATAN     Row Name 01/19/22 1514          Positioning and Restraints    Pre-Treatment Position in bed  -NATAN     Post Treatment Position chair  -NATAN     In Chair sitting; call light within reach; encouraged to call for assist  -NATAN           User Key  (r) = Recorded By, (t) = Taken By, (c) = Cosigned By    Initials Name Provider Type    Doris Clark, OT Occupational Therapist               Outcome Measures     Row Name 01/19/22 1523          How much help from another is currently needed...    Putting on and taking off regular lower body clothing? 4  -NATAN     Bathing (including washing, rinsing, and drying) 3  -NATAN     Toileting (which includes using toilet bed pan or urinal) 4  -NATAN     Putting on and taking off regular upper body clothing 4  -NATAN     Taking care of personal grooming (such as brushing teeth) 4  -NATAN     Eating meals 4  -NATAN     AM-PAC 6 Clicks Score (OT) 23  -NATAN     Row Name 01/19/22 0855          How much help from another person do you currently need...    Turning from your back to your side while in flat bed without using bedrails? 4  -VB     Moving from lying on back to sitting on the side of a flat bed without bedrails? 4  -VB     Moving to and from a bed to a chair (including a wheelchair)? 3  -VB     Standing up from a chair using your arms (e.g., wheelchair, bedside chair)? 3  -VB     Climbing 3-5 steps with a railing? 2  -VB     To walk in hospital room? 2  -VB     AM-PAC 6 Clicks Score (PT) 18  -VB     Row Name 01/19/22 1523          Functional Assessment    Outcome Measure Options AM-PAC 6 Clicks Daily Activity (OT)  -NATAN           User Key  (r) = Recorded By, (t) = Taken By,  (c) = Cosigned By    Initials Name Provider Type    Doris Clark OT Occupational Therapist    Ludy Kolb RN Registered Nurse                Occupational Therapy Education                 Title: PT OT SLP Therapies (Done)     Topic: Occupational Therapy (Done)     Point: ADL training (Done)     Description:   Instruct learner(s) on proper safety adaptation and remediation techniques during self care or transfers.   Instruct in proper use of assistive devices.              Learning Progress Summary           Patient Acceptance, E, VU,DU by NATAN at 1/19/2022 1523    Comment: need to slow pace with transfers and making sure has balance before attempting ADL tasks, shower chair benefit    Acceptance, E, VU by MA at 1/16/2022 1015                   Point: Home exercise program (Done)     Description:   Instruct learner(s) on appropriate technique for monitoring, assisting and/or progressing therapeutic exercises/activities.              Learning Progress Summary           Patient Acceptance, E,D, VU,DU by NATAN at 1/18/2022 1152    Comment: UE tband, benefit of use                   Point: Precautions (Done)     Description:   Instruct learner(s) on prescribed precautions during self-care and functional transfers.              Learning Progress Summary           Patient Acceptance, E, VU,DU by NATAN at 1/19/2022 1523    Comment: need to slow pace with transfers and making sure has balance before attempting ADL tasks, shower chair benefit    Acceptance, E, VU by MA at 1/16/2022 1015                   Point: Body mechanics (Done)     Description:   Instruct learner(s) on proper positioning and spine alignment during self-care, functional mobility activities and/or exercises.              Learning Progress Summary           Patient Acceptance, E, VU by MA at 1/16/2022 1015                               User Key     Initials Effective Dates Name Provider Type Discipline    NATAN 06/16/21 -  Doris Bonilla OT  Occupational Therapist OT    MA 11/19/20 -  Ann Marie Palmer OT Occupational Therapist OT              OT Recommendation and Plan  Therapy Frequency (OT): daily  Plan of Care Review  Plan of Care Reviewed With: patient  Progress: improving  Outcome Summary: Re-evaluation completed with pt. demonstrating ability to dress self post setup once instructed to slow pace of mvmt.  Pt. reports toileting on own using grab bar, sink beside toilet at home per pt. report. Pt.  only able to tolerate toe touch down LLE at this time. Good progess to all goals.  Recommend home at discharge with family assist, wx recommended and OP PT therapy.  No further skilled OT services needed.     Time Calculation:    Time Calculation- OT     Row Name 01/19/22 1525             Time Calculation- OT    OT Start Time 1328  -NTAAN      OT Received On 01/19/22  -NATAN      OT Goal Re-Cert Due Date 01/26/22  -NATAN              Timed Charges    53028 - OT Therapeutic Activity Minutes 3  -NATAN      61658 - OT Self Care/Mgmt Minutes 6  -NATAN              Untimed Charges    OT Eval/Re-eval Minutes 8  -NATAN              Total Minutes    Timed Charges Total Minutes 9  -NATAN      Untimed Charges Total Minutes 8  -NATAN       Total Minutes 17  -NATAN            User Key  (r) = Recorded By, (t) = Taken By, (c) = Cosigned By    Initials Name Provider Type    Doris Clark OT Occupational Therapist              Therapy Charges for Today     Code Description Service Date Service Provider Modifiers Qty    15995041693 HC OT THER PROC EA 15 MIN 1/18/2022 Doris Bonilla OT GO 2    12110754217 HC OT SELF CARE/MGMT/TRAIN EA 15 MIN 1/19/2022 Doris Bonilla OT GO 1    62320898805 HC OT RE-EVAL 2 1/19/2022 Doris Bonilla OT GO 1               Doris Bonilla OT  1/19/2022

## 2022-01-19 NOTE — DISCHARGE SUMMARY
[facility] Medicine Services  DISCHARGE SUMMARY    Patient Name: Jagdish Kennedy  : 1990  MRN: [id number]    Date of Admission: 1/15/2022 11:24 AM  Date of Discharge:  22  Primary Care Physician: Provider, No Known    Consults     No orders found from 2021 to 2022.          Hospital Course     Presenting Problem:   Traumatic compartment syndrome of left lower extremity, initial encounter ([facility]) [T79.A22A]    Active Hospital Problems    Diagnosis  POA   • **Traumatic compartment syndrome of left lower extremity ([facility]) [T79.A22A]  Yes   • Lab test positive for detection of COVID-19 virus [U07.1]  Yes      Resolved Hospital Problems   No resolved problems to display.          Hospital Course:  Jagdish Kennedy is a 31 y.o. male fully vaccinated for COVID-19 who presented after traumatic injury to his left leg at work with subsequent pain and increasing swelling. He was admitted for concern of compartment syndrome; he was incidentally and asymptomatically COVID-19 positive in the ED. Patient was taken to the OR 1/15 by Dr. Zhu and fasciotomy performed.     Assessment/plan     Traumatic injury to the left leg  Compartment syndrome of the left calf/leg  - s/p fasciotomy per Dr. Zhu to the LLE (1/15)  - back to OR  for closure  - patient was evaluated by PT and rolling walker provided. Rx for pain control provided  - patient to keep dressing in place and he has follow up arranged with Dr. Zhu  at 10:20 AM   - ASA for DVT ppx      Asymptomatic COVID-19 positive lab test  - Fully vaccinated for the same  - Repeat COVID test positive.  - Did well on room air; did not receive treatment      Discharge Follow Up Recommendations for outpatient labs/diagnostics:  PCP 1 week  Orthopedics Dr. Zhu 22 at 10:20 AM     Day of Discharge     HPI:   No acute events. Has pain but controlled at rest. Worked with PT and did well managing stairs. Reviewed follow ups  and plan for discharge. Patient agreeable.     Review of Systems  Gen- No fevers, chills  CV- No chest pain, palpitations  Resp- No cough, dyspnea  GI- No N/V/D, abd pain    Vital Signs:   Temp:  [96 °F (35.6 °C)-97 °F (36.1 °C)] 96.9 °F (36.1 °C)  Heart Rate:  [88-94] 93  Resp:  [18-21] 18  BP: (110-129)/(69-90) 129/82      Physical Exam:  Constitutional: No acute distress, awake, alert  HENT: NCAT, mucous membranes moist  Respiratory: Clear to auscultation bilaterally, respiratory effort normal   Cardiovascular: RRR, no murmurs, rubs, or gallops  Gastrointestinal: Positive bowel sounds, soft, nontender, nondistended  Musculoskeletal: left lower leg incision clean/dry; edema upper thigh  Psychiatric: Appropriate affect, cooperative  Neurologic: Oriented x 3, strength symmetric in all extremities, Cranial Nerves grossly intact to confrontation, speech clear  Skin: No rashes    Pertinent  and/or Most Recent Results     LAB RESULTS:      Lab 01/19/22  0554 01/18/22  0719 01/17/22  1201 01/15/22  1245   WBC 10.40 6.42 8.14 12.86*   HEMOGLOBIN 12.4* 12.2* 12.2* 15.0   HEMATOCRIT 38.0 37.9 37.9 45.6   PLATELETS 243 205 211 269   NEUTROS ABS  --   --   --  10.62*   IMMATURE GRANS (ABS)  --   --   --  0.05   LYMPHS ABS  --   --   --  1.39   MONOS ABS  --   --   --  0.71   EOS ABS  --   --   --  0.04   MCV 87.8 88.6 89.0 86.4   LACTATE  --   --   --  1.7         Lab 01/19/22  0554 01/18/22  0719 01/17/22  1542 01/15/22  1245   SODIUM 138 138 139 139   POTASSIUM 4.0 4.4 4.5 4.2   CHLORIDE 103 104 104 103   CO2 24.0 27.0 28.0 26.0   ANION GAP 11.0 7.0 7.0 10.0   BUN 14 13 12 14   CREATININE 0.69* 0.80 1.16 0.98   GLUCOSE 112* 93 102* 103*   CALCIUM 9.4 9.2 8.8 9.8         Lab 01/17/22  1542 01/15/22  1245   TOTAL PROTEIN 6.1 7.8   ALBUMIN 3.90 5.20   GLOBULIN 2.2 2.6   ALT (SGPT) 14 21   AST (SGOT) 18 20   BILIRUBIN 0.3 0.6   ALK PHOS 53 63                 Lab 01/15/22  1236   ABO TYPING O   RH TYPING Positive   ANTIBODY  SCREEN Negative         Brief Urine Lab Results     None        Microbiology Results (last 10 days)     Procedure Component Value - Date/Time    COVID PRE-OP / PRE-PROCEDURE SCREENING ORDER (NO ISOLATION) - Swab, Nasopharynx [[id number]]  (Abnormal) Collected: 01/15/22 1941    Lab Status: Final result Specimen: Swab from Nasopharynx Updated: 01/15/22 2044    Narrative:      The following orders were created for panel order COVID PRE-OP / PRE-PROCEDURE SCREENING ORDER (NO ISOLATION) - Swab, Nasopharynx.  Procedure                               Abnormality         Status                     ---------                               -----------         ------                     COVID-19 and FLU A/B PCR...[[id number]]  Abnormal            Final result                 Please view results for these tests on the individual orders.    COVID-19 and FLU A/B PCR - Swab, Nasopharynx [[id number]]  (Abnormal) Collected: 01/15/22 1941    Lab Status: Final result Specimen: Swab from Nasopharynx Updated: 01/15/22 2044     COVID19 Detected     Influenza A PCR Not Detected     Influenza B PCR Not Detected    Narrative:      Fact sheet for providers: https://www.fda.gov/media/885185/download    Fact sheet for patients: https://www.fda.gov/media/592532/download    Test performed by PCR.  Influenza A and Influenza B negative results should be considered presumptive in samples that have a positive SARS-CoV-2 result.    Competitive inhibition studies showed that SARS-CoV-2 virus, when present at concentrations above 3.6E+04 copies/mL, can inhibit the detection and amplification of influenza A and influenza B virus RNA if present at or below 1.8E+02 copies/mL or 4.9E+02 copies/mL, respectively, and may lead to false negative influenza virus results. If co-infection with influenza A or influenza B virus is suspected in samples with a positive SARS-CoV-2 result, the sample should be re-tested with another FDA cleared, approved, or authorized  influenza test, if influenza virus detection would change clinical management.    COVID-19, FLU A/B, RSV PCR - Swab, Nasopharynx [[id number]]  (Abnormal) Collected: 01/15/22 1249    Lab Status: Final result Specimen: Swab from Nasopharynx Updated: 01/15/22 1400     COVID19 Detected     Influenza A PCR Not Detected     Influenza B PCR Not Detected     RSV, PCR Not Detected    Narrative:      Fact sheet for providers: https://www.fda.gov/media/449104/download    Fact sheet for patients: https://www.fda.gov/media/651977/download    Test performed by PCR.          XR Knee 1 or 2 View Left    Result Date: 1/15/2022  EXAMINATION: XR TIBIA FIBULA 2 VW LEFT-, XR FOOT 3+ VW LEFT-, XR KNEE 1 OR 2 VW LEFT-, XR ANKLE 3+ VW LEFT-  INDICATION: injury  COMPARISON: NONE  FINDINGS: There is extensive subcutaneous edema and soft tissue reticulation noted along the medial aspect of the proximal tibia/knee. Cortical margins are otherwise intact, without evidence of acute fracture. Small suprapatellar joint effusion is present.  The ankle mortise is symmetric with an intact talar dome. No fracture or dislocation. Cortical margins of the left foot are maintained without evidence of fracture, dislocation or suspicious osseous lesion. There is no evidence of subluxation or dislocation.      Fairly extensive superficial soft tissue edema is noted along the medial aspect of the left knee and proximal tibia, with small suprapatellar joint effusion also noted. The remainder of the imaged left lower extremity otherwise demonstrates no evidence of acute osseous or articular abnormality.  This report was finalized on 1/15/2022 12:46 PM by Yordan Melendez.      XR Tibia Fibula 2 View Left    Result Date: 1/15/2022  EXAMINATION: XR TIBIA FIBULA 2 VW LEFT-, XR FOOT 3+ VW LEFT-, XR KNEE 1 OR 2 VW LEFT-, XR ANKLE 3+ VW LEFT-  INDICATION: injury  COMPARISON: NONE  FINDINGS: There is extensive subcutaneous edema and soft tissue reticulation noted along  the medial aspect of the proximal tibia/knee. Cortical margins are otherwise intact, without evidence of acute fracture. Small suprapatellar joint effusion is present.  The ankle mortise is symmetric with an intact talar dome. No fracture or dislocation. Cortical margins of the left foot are maintained without evidence of fracture, dislocation or suspicious osseous lesion. There is no evidence of subluxation or dislocation.      Fairly extensive superficial soft tissue edema is noted along the medial aspect of the left knee and proximal tibia, with small suprapatellar joint effusion also noted. The remainder of the imaged left lower extremity otherwise demonstrates no evidence of acute osseous or articular abnormality.  This report was finalized on 1/15/2022 12:46 PM by Yordan Melendez.      XR Ankle 3+ View Left    Result Date: 1/15/2022  EXAMINATION: XR TIBIA FIBULA 2 VW LEFT-, XR FOOT 3+ VW LEFT-, XR KNEE 1 OR 2 VW LEFT-, XR ANKLE 3+ VW LEFT-  INDICATION: injury  COMPARISON: NONE  FINDINGS: There is extensive subcutaneous edema and soft tissue reticulation noted along the medial aspect of the proximal tibia/knee. Cortical margins are otherwise intact, without evidence of acute fracture. Small suprapatellar joint effusion is present.  The ankle mortise is symmetric with an intact talar dome. No fracture or dislocation. Cortical margins of the left foot are maintained without evidence of fracture, dislocation or suspicious osseous lesion. There is no evidence of subluxation or dislocation.      Fairly extensive superficial soft tissue edema is noted along the medial aspect of the left knee and proximal tibia, with small suprapatellar joint effusion also noted. The remainder of the imaged left lower extremity otherwise demonstrates no evidence of acute osseous or articular abnormality.  This report was finalized on 1/15/2022 12:46 PM by Yordan Melendez.      XR Foot 3+ View Left    Result Date:  1/15/2022  EXAMINATION: XR TIBIA FIBULA 2 VW LEFT-, XR FOOT 3+ VW LEFT-, XR KNEE 1 OR 2 VW LEFT-, XR ANKLE 3+ VW LEFT-  INDICATION: injury  COMPARISON: NONE  FINDINGS: There is extensive subcutaneous edema and soft tissue reticulation noted along the medial aspect of the proximal tibia/knee. Cortical margins are otherwise intact, without evidence of acute fracture. Small suprapatellar joint effusion is present.  The ankle mortise is symmetric with an intact talar dome. No fracture or dislocation. Cortical margins of the left foot are maintained without evidence of fracture, dislocation or suspicious osseous lesion. There is no evidence of subluxation or dislocation.      Fairly extensive superficial soft tissue edema is noted along the medial aspect of the left knee and proximal tibia, with small suprapatellar joint effusion also noted. The remainder of the imaged left lower extremity otherwise demonstrates no evidence of acute osseous or articular abnormality.  This report was finalized on 1/15/2022 12:46 PM by Yordan Melendez.      MRI Knee Left Without Contrast    Result Date: 1/17/2022  EXAMINATION: MRI KNEE LEFT  WO CONTRAST- 01/17/2022  INDICATION: Knee instability  TECHNIQUE: Multiplanar MRI of the knee without intravenous contrast  COMPARISON: Knee x-ray 01/15/2022  FINDINGS: Extensor mechanism intact with quadriceps and patellar tendons in normal limits. Subcutaneous edema throughout including prepatellar edema.  Patellofemoral joint space reveals no evidence for high-grade chondromalacia or disruption of the patellar retinaculum with small suprapatellar effusion however no evidence for loose body.  Femorotibial joint spaces reveal preserved distal femoral and proximal tibial plateau cortical margins. ACL and PCL intact. Lateral meniscus intact and medial meniscus intact. MCL intact and unremarkable. Lateral ligamentous complex reveals lateral collateral intact and fibular collateral intact with  questionable increased signal involving the distal popliteal tendon likely adjacent or relative fluid signal from adjacent subcutaneous edema without irregularity or disruption.      Extensive subcutaneous soft tissue edema throughout the visualized soft tissues and field-of-view including prepatellar edema. Trace suprapatellar edema may be secondary to the subcutaneous involvement otherwise no internal derangement.   D:  01/17/2022 E:  01/17/2022  This report was finalized on 1/17/2022 9:39 AM by Dr. Hai Escalante.      Duplex Venous Lower Extremity - Bilateral CAR    Result Date: 1/17/2022  · Normal bilateral lower extremity venous duplex scan.        Results for orders placed during the hospital encounter of 01/15/22    Duplex Venous Lower Extremity - Bilateral CAR    Interpretation Summary  · Normal bilateral lower extremity venous duplex scan.      Results for orders placed during the hospital encounter of 01/15/22    Duplex Venous Lower Extremity - Bilateral CAR    Interpretation Summary  · Normal bilateral lower extremity venous duplex scan.          Plan for Follow-up of Pending Labs/Results:     Discharge Details        Discharge Medications      New Medications      Instructions Start Date   aspirin 81 MG EC tablet   81 mg, Oral, Daily      HYDROcodone-acetaminophen 5-325 MG per tablet  Commonly known as: NORCO   1 tablet, Oral, Every 4 Hours PRN      sennosides-docusate 8.6-50 MG per tablet  Commonly known as: PERICOLACE   2 tablets, Oral, 2 Times Daily PRN             No Known Allergies      Discharge Disposition:  Home or Self Care    Diet:  Hospital:  Diet Order   Procedures   • Diet Regular       Activity:  Activity Instructions     Activity as Tolerated            Restrictions or Other Recommendations:         CODE STATUS:    Code Status and Medical Interventions:   Ordered at: 01/15/22 1516     Code Status (Patient has no pulse and is not breathing):    CPR (Attempt to Resuscitate)     Medical  Interventions (Patient has pulse or is breathing):    Full Support       No future appointments.    Additional Instructions for the Follow-ups that You Need to Schedule     Discharge Follow-up with PCP   As directed       Currently Documented PCP:    Provider, No Known    PCP Phone Number:    None     Follow Up Details: PCP 1 week         Discharge Follow-up with Specified Provider: Orthopedics Dr. Zhu 1/24/22 at 10:20 AM   As directed      To: Orthopedics Dr. Zhu 1/24/22 at 10:20 AM                     Tiffany Mccain DO  01/19/22      Time Spent on Discharge:  I spent  35  minutes on this discharge activity which included: face-to-face encounter with the patient, reviewing the data in the system, coordination of the care with the nursing staff as well as consultants, documentation, and entering orders.

## 2022-01-19 NOTE — PLAN OF CARE
Goal Outcome Evaluation:         Pt being discharged to home. IV removed and tele box replaced.

## 2022-01-19 NOTE — PLAN OF CARE
Goal Outcome Evaluation:  Plan of Care Reviewed With: patient        Progress: improving  Outcome Summary: Pt VSS and on RA. Pt alert and oriented, up with assist x1. Pt with no complaints throughout the night. Pt rested well. this shift. Dressing to LLE remain intact with old sanguenous drainage. Pt NSR on monitor. Will continue plan of care.

## 2022-01-19 NOTE — PLAN OF CARE
Goal Outcome Evaluation:  Plan of Care Reviewed With: patient        Progress: improving  Outcome Summary: Re-evaluation completed with pt. demonstrating ability to dress self post setup once instructed to slow pace of mvmt.  Pt. reports toileting on own using grab bar, sink beside toilet at home per pt. report. Pt.  only able to tolerate toe touch down LLE at this time. Good progess to all goals.  Recommend home at discharge with family assist, wx recommended and OP PT therapy.  No further skilled OT services needed.

## 2022-01-19 NOTE — PLAN OF CARE
Goal Outcome Evaluation:  Plan of Care Reviewed With: patient        Progress: improving  Outcome Summary: PT Re-eval complete. Patient presents with deficits in balance and pain affecting independence with mobility. He ambulated 70ft+70ft with RW and CGA, demonstrating unsteadiness only when moving too quickly. Pt primarily avoiding bearing weight through LLE due to pain, occasionally completing gait with toe touch weight bearing. He practiced navigating a flight of stairs via bumping up on his buttocks, then hopping step-to-step to descend. Pt educated about safety with stairs, use of gait belt, and having family assist. Skilled IP PT warranted. Recommend home with family assist, RW, and OP PT at discharge.

## 2022-01-19 NOTE — DISCHARGE INSTR - APPOINTMENTS
Additional Instructions for the Follow-ups that You Need to Schedule    Additional Instructions for the Follow-ups that You Need to Schedule   Discharge Follow-up with PCP  As directed   Currently Documented PCP:   Provider, No Known   PCP Phone Number:   None    Follow Up Details: PCP 1 week        Discharge Follow-up with Specified Provider: Orthopedics Dr. Zhu 1/24/22 at 10:20 AM  As directed   To: Orthopedics Dr. Zhu 1/24/22 at 10:20 AM

## 2022-01-20 ENCOUNTER — READMISSION MANAGEMENT (OUTPATIENT)
Dept: CALL CENTER | Facility: HOSPITAL | Age: 32
End: 2022-01-20

## 2022-01-20 NOTE — OUTREACH NOTE
COVID-19 Week 1 Survey      Responses   [facility] patient discharged from? [place]   Does the patient have one of the following disease processes/diagnoses(primary or secondary)? COVID-19   COVID-19 underlying condition? Surgery   Call Number Call 1   Week 1 Call successful? No  [Pt. phone attempt x 2 unsuccessful]   Discharge diagnosis Traumatic compartment syndrome of left lower extremity,    Asymptomatic COVID-19    Comments regarding appointments Appointment with Ortho 01/24/2022.          Mattie Maya RN

## 2022-01-20 NOTE — OUTREACH NOTE
Prep Survey      Responses   [facility] facility patient discharged from? [place]   Is LACE score < 7 ? No   Emergency Room discharge w/ pulse ox? No   Eligibility Readm Mgmt   Discharge diagnosis Traumatic compartment syndrome of left lower extremity,    Asymptomatic COVID-19    Does the patient have one of the following disease processes/diagnoses(primary or secondary)? COVID-19   Does the patient have Home health ordered? No   Is there a DME ordered? Yes   What DME was ordered? [facility] [employer] - [employer]   Prep survey completed? Yes          Nichole Guerrero RN

## 2022-01-21 ENCOUNTER — READMISSION MANAGEMENT (OUTPATIENT)
Dept: CALL CENTER | Facility: HOSPITAL | Age: 32
End: 2022-01-21

## 2022-01-21 NOTE — OUTREACH NOTE
COVID-19 Week 1 Survey      Responses   [facility] patient discharged from? [place]   Does the patient have one of the following disease processes/diagnoses(primary or secondary)? Other   Call Number Call 2   Week 1 Call successful? No   Discharge diagnosis Traumatic compartment syndrome of left lower extremity,    Asymptomatic COVID-19           Joann Donis RN

## 2022-01-22 ENCOUNTER — READMISSION MANAGEMENT (OUTPATIENT)
Dept: CALL CENTER | Facility: HOSPITAL | Age: 32
End: 2022-01-22

## 2022-01-22 NOTE — OUTREACH NOTE
COVID-19 Week 1 Survey      Responses   [facility] patient discharged from? [place]   Does the patient have one of the following disease processes/diagnoses(primary or secondary)? Other   Call Number Call 3   Week 1 Call successful? No   Revoke Decline to participate  [According to ED note patient is asymptomatic.  ]   Discharge diagnosis Traumatic compartment syndrome of left lower extremity,    Asymptomatic COVID-19           Danielle Dempsey RN

## 2022-01-24 ENCOUNTER — OFFICE VISIT (OUTPATIENT)
Dept: ORTHOPEDIC SURGERY | Facility: CLINIC | Age: 32
End: 2022-01-24

## 2022-01-24 VITALS — TEMPERATURE: 97.8 F

## 2022-01-24 DIAGNOSIS — T79.A22S TRAUMATIC COMPARTMENT SYNDROME OF LEFT LOWER EXTREMITY, SEQUELA: Primary | ICD-10-CM

## 2022-01-24 DIAGNOSIS — U07.1 LAB TEST POSITIVE FOR DETECTION OF COVID-19 VIRUS: ICD-10-CM

## 2022-01-24 PROCEDURE — 99024 POSTOP FOLLOW-UP VISIT: CPT | Performed by: ORTHOPAEDIC SURGERY

## 2022-01-24 NOTE — PROGRESS NOTES
Chief Complaint   Patient presents with   • Post-op     1 week s/p (L) tibia fasciotomy 1/15/22, wound closure (L) leg 1/18/2022           HPI  He is follow-up his left lower extremity fasciotomy and wound closure compartment syndrome.  He is doing well.  No new complaints.  He still has some knee pain.      Vitals:    01/24/22 1025   Temp: 97.8 °F (36.6 °C)         Physical Exam:  He has bruising and stiffness.  His fasciotomy incision looks perfect.  He does have 2 blisters on his leg that were there from a traumatic injury.  No sign of infection.      X-RAY REPORT:  Imaging Results (Last 7 Days)     ** No results found for the last 168 hours. **        Previous MRI reviewed from January 17 with soft tissue edema and bruising only.  No tears        ICD-10-CM ICD-9-CM   1. Traumatic compartment syndrome of left lower extremity, sequela  T79.A22S 908.6   2. Lab test positive for detection of COVID-19 virus  U07.1 079.89       Orders Placed This Encounter   Procedures   • Ambulatory Referral to Physical Therapy Evaluate and treat, Ortho     He will do physical therapy.  Weight-bear as tolerated.  Work on knee and ankle range of motion.  Follow-up in 4 weeks.  His work restriction is seated job only or off work        Chance Zhu M.D., [facility]  Orthopedic Surgeon  Fellowship Trained Sports Medicine  [facility]  Orthopedics and Sports Medicine  [address] [place] [address], [address]  [place] [address]      EMR Dragon/Transcription disclaimer:  Much of this encounter note is an electronic transcription of spoken language to printed text. Electronic transcription of spoken language may permit erroneous, or at times, nonsensical words or phrases to be inadvertently transcribed. Although I have reviewed the note for such errors, some may still exist.

## 2022-01-25 ENCOUNTER — TELEPHONE (OUTPATIENT)
Dept: ORTHOPEDIC SURGERY | Facility: CLINIC | Age: 32
End: 2022-01-25

## 2022-01-25 NOTE — TELEPHONE ENCOUNTER
Caller: TONY DE PAZ  WORK COMP    Relationship:  [employer]    Best call back number:[phone]    What form or medical record are you requesting: ER NOTES, OFFICE NOTES, SURGERY NOTES, OFF WORK STATUS, PT ORDER    Who is requesting this form or medical record from you: [employer]    How would you like to receive the form or medical records (pick-up, mail, fax): FAX  If fax, what is the fax number: [phone]    ADJUSTOR: GEMINI OLMEDO [phone]

## 2022-01-26 ENCOUNTER — TREATMENT (OUTPATIENT)
Dept: PHYSICAL THERAPY | Facility: CLINIC | Age: 32
End: 2022-01-26

## 2022-01-26 DIAGNOSIS — M79.89 PAIN AND SWELLING OF LEFT LOWER LEG: Primary | ICD-10-CM

## 2022-01-26 DIAGNOSIS — M79.662 PAIN AND SWELLING OF LEFT LOWER LEG: Primary | ICD-10-CM

## 2022-01-26 DIAGNOSIS — M25.662 STIFFNESS OF LEFT KNEE: ICD-10-CM

## 2022-01-26 DIAGNOSIS — T79.A22A TRAUMATIC COMPARTMENT SYNDROME OF LEFT LOWER EXTREMITY, INITIAL ENCOUNTER: ICD-10-CM

## 2022-01-26 PROCEDURE — 97110 THERAPEUTIC EXERCISES: CPT | Performed by: PHYSICAL THERAPIST

## 2022-01-26 PROCEDURE — 97112 NEUROMUSCULAR REEDUCATION: CPT | Performed by: PHYSICAL THERAPIST

## 2022-01-26 PROCEDURE — 97161 PT EVAL LOW COMPLEX 20 MIN: CPT | Performed by: PHYSICAL THERAPIST

## 2022-01-26 NOTE — PROGRESS NOTES
Physical Therapy Initial Evaluation and Plan of Care    Patient: Jagdish Kennedy   : 1990  Diagnosis/ICD-10 Code:  Pain and swelling of left lower leg [M79.662, M79.89]  Referring practitioner: Chance Zhu MD  Date of Initial Visit: 2022  Today's Date: 2022  Patient seen for 1 sessions             Subjective Evaluation    History of Present Illness  Date of onset: 1/15/2022  Date of surgery: 1/15/2022 (second surgery 22)  Mechanism of injury: Pt had his leg crushed between two forklifts. He avoided any fracture of internal knee injury, but he developed compartment syndrome as a result. Pt had two surgeries for fasciotomy for the left lower leg. Since the surgery he has had pain on the inside of the left lower leg and back of the leg lower leg and thigh. He is able to put a small amount of weight on his left LE, but he is unable to put his foot flat while walking.       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)  Quality of life: good    Pain  Current pain ratin  At best pain ratin  At worst pain ratin  Location: Inside and back of the left lower leg and thigh  Quality: dull ache, pressure and throbbing  Relieving factors: rest, change in position and medications (aspirin, hydrocodone)  Aggravating factors: ambulation, stairs, standing, prolonged positioning and movement  Progression: improved    Social Support  Lives in: multiple-level home    Diagnostic Tests  MRI studies: normal    Treatments  No previous or current treatments  Patient Goals  Patient goals for therapy: decreased edema, decreased pain, increased motion, improved balance, increased strength, independence with ADLs/IADLs and return to work             Objective          Palpation     Additional Palpation Details  Significant swelling and bruising noted at the medial left thigh and lower leg. TTP noted in medial left LE superior  and inferior to the knee joint line.     Active Range of Motion   Left Knee   Flexion: 87 degrees   Extension: 18 degrees   Left Ankle/Foot   Plantar flexion: 48 degrees   Inversion: 37 degrees   Eversion: 17 degrees     Additional Active Range of Motion Details  Left ankle DF - (-9)        Strength/Myotome Testing     Left Knee   Flexion: 4  Extension: 4+  Quadriceps contraction: fair    Left Ankle/Foot   Dorsiflexion: 4  Plantar flexion: 4+  Inversion: 4  Eversion: 4+    Ambulation     Ambulation: Level Surfaces     Additional Level Surfaces Ambulation Details  Pt ambulates with a standard walker in the clinic. Toes touch weight bearing on the LLE with decreased terminal knee extension in stance phase.           Assessment & Plan     Assessment  Impairments: abnormal gait, abnormal muscle tone, abnormal or restricted ROM, activity intolerance, impaired balance, impaired physical strength, lacks appropriate home exercise program, pain with function and weight-bearing intolerance  Functional Limitations: carrying objects, lifting, walking, uncomfortable because of pain, sitting and standing  Assessment details: Patient is a 31 year old male who comes to physical therapy s/p LLE crush injury with compartment syndrome resulting in pain, decreased ROM, decreased strength, and inability to perform all essential functional activities. Pt will benefit from skilled PT services to address the above issues.     Prognosis details:   SHORT TERM GOALS:  2 weeks       1. Pt independent with HEP  2. Pt to demonstrate zuleyka hip strength 4/5 or greater to improve stability with ambulation  3. Pt to report being able to walk for 10 minutes without increasing pain in the left knee    LONG TERM GOALS:   8 weeks  1. Pt to demonstrate ability to perform full functional squat with good form and control of the knees and without increasing pain  2. Pt to demonstrate zuleyka hip strength to 4+/5 or greater to improve safety with ambulation on  uneven surfaces  3. Pt to return to work full duty without increased pain in the left knee   4. Pt to demonstrate ability to perform step up/down 8 inch step x10 safely and without pain in the left knee       Plan  Therapy options: will be seen for skilled therapy services  Planned modality interventions: cryotherapy, electrical stimulation/Russian stimulation, high voltage pulsed current (pain management), iontophoresis, microcurrent electrical stimulation, TENS, thermotherapy (hydrocollator packs) and ultrasound  Planned therapy interventions: abdominal trunk stabilization, ADL retraining, balance/weight-bearing training, body mechanics training, flexibility, functional ROM exercises, gait training, home exercise program, IADL retraining, joint mobilization, manual therapy, motor coordination training, neuromuscular re-education, soft tissue mobilization, strengthening, stretching and therapeutic activities  Frequency: 2x week  Duration in weeks: 12  Treatment plan discussed with: patient        Manual Therapy:         mins  64257;  Therapeutic Exercise:    15     mins  27973;     Neuromuscular Yury:    15    mins  87516;    Therapeutic Activity:          mins  08209;     Gait Training:           mins  35966;     Ultrasound:          mins  58510;    Electrical Stimulation:         mins  63885 ( );  Iontophoresis          mins 45767   Traction          mins  00361  Fluidotherapy          mins  97117  Dry Needling          mins self-pay  Paraffin          mins  93859    Timed Treatment:   30   mins   Total Treatment:     55   mins    PT SIGNATURE: Jimbo Stovall PT, DPT, OCS, Cert. DN   DATE TREATMENT INITIATED: 1/26/2022    Initial Certification  Certification Period: 1/26/2022 thru 4/26/2022  I certify that the therapy services are furnished while this patient is under my care.  The services outlined above are required by this patient, and will be reviewed every 90 days.     PHYSICIAN: Chance Zhu,  MD      DATE:     Please sign and return via fax to [phone].. Thank you, [facility].

## 2022-01-28 ENCOUNTER — TREATMENT (OUTPATIENT)
Dept: PHYSICAL THERAPY | Facility: CLINIC | Age: 32
End: 2022-01-28

## 2022-01-28 DIAGNOSIS — M79.662 PAIN AND SWELLING OF LEFT LOWER LEG: Primary | ICD-10-CM

## 2022-01-28 DIAGNOSIS — M25.662 STIFFNESS OF LEFT KNEE: ICD-10-CM

## 2022-01-28 DIAGNOSIS — M79.89 PAIN AND SWELLING OF LEFT LOWER LEG: Primary | ICD-10-CM

## 2022-01-28 PROCEDURE — 97112 NEUROMUSCULAR REEDUCATION: CPT | Performed by: PHYSICAL THERAPIST

## 2022-01-28 PROCEDURE — 97110 THERAPEUTIC EXERCISES: CPT | Performed by: PHYSICAL THERAPIST

## 2022-02-01 NOTE — PROGRESS NOTES
Physical Therapy Daily Progress Note    Subjective   Jagdish Kennedy reports that he has been working on some exercise at home and he feels like his knee is getting more loose. He has been trying to put more weight on the left LE when walking       Objective   See Exercise, Manual, and Modality Logs for complete treatment.       Assessment/Plan     Pt has responded well to treatment so far and he demonstartes significantly improved left knee AROM. Focused on left knee and ankle AROM and beginning more standing exercise in the clinic today.     Progress per Plan of Care and Progress strengthening /stabilization /functional activity           Manual Therapy:         mins  65732;  Therapeutic Exercise:    30     mins  04235;     Neuromuscular Yury:    25    mins  58910;    Therapeutic Activity:          mins  13293;     Gait Training:           mins  99385;     Ultrasound:          mins  83771;    Electrical Stimulation:         mins  92353 ( );  E-Stim Attended:         mins  97246  Iontophoresis          mins 09725   Traction          mins  91796  Fluidotherapy          mins  74254  Dry Needling          mins self-pay - No Charge  Paraffin          mins  91385    Timed Treatment:   55   mins   Total Treatment:     55   mins    Jimbo Stovall, PT, DPT, OCS, Cert. DN  Physical Therapist

## 2022-02-02 ENCOUNTER — TREATMENT (OUTPATIENT)
Dept: PHYSICAL THERAPY | Facility: CLINIC | Age: 32
End: 2022-02-02

## 2022-02-02 DIAGNOSIS — M79.662 PAIN AND SWELLING OF LEFT LOWER LEG: Primary | ICD-10-CM

## 2022-02-02 DIAGNOSIS — M25.662 STIFFNESS OF LEFT KNEE: ICD-10-CM

## 2022-02-02 DIAGNOSIS — M79.89 PAIN AND SWELLING OF LEFT LOWER LEG: Primary | ICD-10-CM

## 2022-02-02 PROCEDURE — 97110 THERAPEUTIC EXERCISES: CPT | Performed by: PHYSICAL THERAPIST

## 2022-02-02 PROCEDURE — 97112 NEUROMUSCULAR REEDUCATION: CPT | Performed by: PHYSICAL THERAPIST

## 2022-02-02 PROCEDURE — 97140 MANUAL THERAPY 1/> REGIONS: CPT | Performed by: PHYSICAL THERAPIST

## 2022-02-02 NOTE — PROGRESS NOTES
Physical Therapy Daily Progress Note        Patient: Jagdish Kennedy   : 1990  Diagnosis/ICD-10 Code:  Pain and swelling of left lower leg [M79.662, M79.89]  Referring practitioner: Chance Zhu MD  Date of Initial Visit: Type: THERAPY  Noted: 2022  Today's Date: 2022  Patient seen for 3 sessions             Subjective   Jagdish Kennedy reports: able to put on his own shoes for the first time this morning. He is not able to stand up by himself and has been toe touch weight bearing because it makes the knee hurt a lot when trying.     Objective   AROM ankle-  L DF: -8 deg pre treatment  -2 deg post treatment    See Exercise, Manual, and Modality Logs for complete treatment.       Assessment/Plan  Focused on ankle mobility due to unable to stand independently possibly from more stress at the knee due to ankle hypomobile and limited. He was able to stand with no AD by the end of treatment with minimal discomfort in knee. Progressed table exercises.   Progress per Plan of Care and Progress strengthening /stabilization /functional activity           Timed:  Manual Therapy:    15     mins  80387;  Therapeutic Exercise:    48     mins  28654;     Neuromuscular Yury:    10    mins  79343;    Therapeutic Activity:          mins  54190;     Gait Training:           mins  21794;     Ultrasound:          mins  67351;    Electrical Stimulation:         mins  37393;  Iontophoresis          mins  87895    Untimed:  Electrical Stimulation:         mins  19482 ( );  Mechanical Traction:         mins  09370;   Fluidotherapy          mins  13004    Timed Treatment:   73   mins   Total Treatment:     73   mins        Christy Solares PTA  Physical Therapist Assistant

## 2022-02-04 ENCOUNTER — TREATMENT (OUTPATIENT)
Dept: PHYSICAL THERAPY | Facility: CLINIC | Age: 32
End: 2022-02-04

## 2022-02-04 DIAGNOSIS — M79.89 PAIN AND SWELLING OF LEFT LOWER LEG: Primary | ICD-10-CM

## 2022-02-04 DIAGNOSIS — M79.662 PAIN AND SWELLING OF LEFT LOWER LEG: Primary | ICD-10-CM

## 2022-02-04 PROCEDURE — 97140 MANUAL THERAPY 1/> REGIONS: CPT | Performed by: PHYSICAL THERAPIST

## 2022-02-04 PROCEDURE — 97110 THERAPEUTIC EXERCISES: CPT | Performed by: PHYSICAL THERAPIST

## 2022-02-04 PROCEDURE — 97530 THERAPEUTIC ACTIVITIES: CPT | Performed by: PHYSICAL THERAPIST

## 2022-02-04 NOTE — PROGRESS NOTES
Physical Therapy Daily Progress Note    Patient: Jagdish Kennedy   : 1990  Diagnosis/ICD-10 Code:  Pain and swelling of left lower leg [M79.662, M79.89]  Referring practitioner: Chance Zhu MD  Date of Initial Visit: Type: THERAPY  Noted: 2022  Today's Date: 2022  Patient seen for 4 sessions         Jagdish Kennedy reports that his lower leg is feeling better in terms of mobility.  Taking one pain medication before visits to excessive soreness during and after visits.  Sleeping well.  Denies calf pain.  Has localized soreness at the posteromedial side of the knee.  Reluctant to bear too much weight through the leg as it feels like it will give way.      Subjective     Objective   See Exercise, Manual, and Modality Logs for complete treatment.       Assessment/Plan  Mr. Kennedy is s/p L tibial fasciotomy on 22.  Focused visit on improving ankle DF and knee flx mobility.  Combined with instruction on how to comfortably load the L LE using RW as it relates to working toward normalizing gait pattern.           Manual Therapy:    14     mins  70795;  Therapeutic Exercise:    24     mins  74311;     Neuromuscular Yury:        mins  09845;    Therapeutic Activity:     10     mins  54754;     Gait Training:           mins  95466;     Ultrasound:          mins  72491;    Electrical Stimulation:         mins  74360 ( );  Dry Needling          mins self-pay    Timed Treatment:   48   mins   Total Treatment:     60   mins    Hao Lopez PT  Physical Therapist

## 2022-02-07 ENCOUNTER — TREATMENT (OUTPATIENT)
Dept: PHYSICAL THERAPY | Facility: CLINIC | Age: 32
End: 2022-02-07

## 2022-02-07 PROCEDURE — 97530 THERAPEUTIC ACTIVITIES: CPT | Performed by: PHYSICAL THERAPIST

## 2022-02-07 PROCEDURE — 97112 NEUROMUSCULAR REEDUCATION: CPT | Performed by: PHYSICAL THERAPIST

## 2022-02-07 PROCEDURE — 97140 MANUAL THERAPY 1/> REGIONS: CPT | Performed by: PHYSICAL THERAPIST

## 2022-02-07 PROCEDURE — 97110 THERAPEUTIC EXERCISES: CPT | Performed by: PHYSICAL THERAPIST

## 2022-02-07 NOTE — PROGRESS NOTES
Physical Therapy Daily Progress Note    Patient: Jagdish Kennedy   : 1990  Diagnosis/ICD-10 Code:  No primary diagnosis found.  Referring practitioner: Chance Zhu MD  Date of Initial Visit: Type: THERAPY  Noted: 2022  Today's Date: 2022  Patient seen for 5 sessions         Jagdish Kennedy reports that his leg was not sore after last visit.  Feels good today.  States that he is walking better as well.    Subjective     Objective   See Exercise, Manual, and Modality Logs for complete treatment.       Assessment/Plan  Mr. Kennedy is s/p L tibial fasciotomy on 22.  Is loading his leg more since last visit with heel strike at initial contact today.  Focused care on improving L ankle mobility.  Knee flx looks excellent today.  Has some intolerance to repetitive loading of the L LE with >50% load while performing TKE.      *Ice along lower limb x 15 min       Manual Therapy:    12     mins  35150;  Therapeutic Exercise:    9     mins  29590;     Neuromuscular Yury:    8    mins  12338;    Therapeutic Activity:     9     mins  32818;     Gait Training:           mins  23477;     Ultrasound:          mins  54132;    Electrical Stimulation:         mins  94180 ( );  Dry Needling          mins self-pay    Timed Treatment:   38   mins   Total Treatment:     53   mins    Hao Lopez PT  Physical Therapist

## 2022-02-11 ENCOUNTER — TREATMENT (OUTPATIENT)
Dept: PHYSICAL THERAPY | Facility: CLINIC | Age: 32
End: 2022-02-11

## 2022-02-11 DIAGNOSIS — M79.89 PAIN AND SWELLING OF LEFT LOWER LEG: Primary | ICD-10-CM

## 2022-02-11 DIAGNOSIS — M79.662 PAIN AND SWELLING OF LEFT LOWER LEG: Primary | ICD-10-CM

## 2022-02-11 PROCEDURE — 97110 THERAPEUTIC EXERCISES: CPT | Performed by: PHYSICAL THERAPIST

## 2022-02-11 PROCEDURE — 97140 MANUAL THERAPY 1/> REGIONS: CPT | Performed by: PHYSICAL THERAPIST

## 2022-02-11 PROCEDURE — 97530 THERAPEUTIC ACTIVITIES: CPT | Performed by: PHYSICAL THERAPIST

## 2022-02-11 PROCEDURE — 97112 NEUROMUSCULAR REEDUCATION: CPT | Performed by: PHYSICAL THERAPIST

## 2022-02-11 NOTE — PROGRESS NOTES
Physical Therapy Initial Evaluation and Plan of Care      Patient: Jagdish Kennedy   : 1990  Diagnosis/ICD-10 Code:  No primary diagnosis found.  Referring practitioner: Chance Zhu MD    Subjective       Objective       Assessment/Plan    Manual Therapy:    ***     mins  66372;  Therapeutic Exercise:    ***     mins  17811;     Neuromuscular Yury:    ***    mins  05795;    Therapeutic Activity:     ***     mins  63347;     Gait Training:      ***     mins  08245;     Ultrasound:     ***     mins  12325;    Electrical Stimulation:    ***     mins  26675 ( );  Dry Needling     ***     mins self-pay    Timed Treatment:   ***   mins   Total Treatment:     ***   mins    PT SIGNATURE: Hao Lopez, PT   DATE TREATMENT INITIATED: 2022    {Certification Type:[id number]}  Certification Period: 2022  I certify that the therapy services are furnished while this patient is under my care.  The services outlined above are required by this patient, and will be reviewed every 90 days.     PHYSICIAN: Chance Zhu MD  NPI: [id number]                                      DATE:    Please sign and return via fax to [phone].. Thank you, [facility].

## 2022-02-11 NOTE — PROGRESS NOTES
Physical Therapy Daily Progress Note      Patient: Jagdish Kennedy   : 1990  Diagnosis/ICD-10 Code:  No primary diagnosis found.  Referring practitioner: Chance Zhu MD  Date of Initial Visit: No linked episodes  Today's Date: 2022  Patient seen for Visit count could not be calculated. Make sure you are using a visit which is associated with an episode. sessions         Jagdish Kennedy reports being able to walk without his walker quite well.  Does have swelling in the lower leg by the end of the day.  Started driving today.    Subjective     Objective   See Exercise, Manual, and Modality Logs for complete treatment.       Assessment/Plan  Mr. Kennedy is s/p L tibial fasciotomy on 22.  Continued emphasis on improving knee flx mobility, fluid movement->heart and genearl loading tolerance of the L LE.      *Provided verbal instruction on use of bike at the gym this weekend and appropriate lower extremity exercises to perform.  Combined with education in edema management.       Manual Therapy:    14     mins  08922;  Therapeutic Exercise:    16     mins  21150;     Neuromuscular Yury:    9    mins  03753;    Therapeutic Activity:     10     mins  33553;     Gait Training:          mins  78004;     Ultrasound:          mins  86998;    Electrical Stimulation:         mins  10905 ( );  Dry Needling          mins self-pay    Timed Treatment:  49    mins   Total Treatment:     59   mins    Hao Lopez PT  Physical Therapist

## 2022-02-11 NOTE — PROGRESS NOTES
Physical Therapy Initial Evaluation and Plan of Care      Patient: Jagdish Kennedy   : 1990  Diagnosis/ICD-10 Code:  No primary diagnosis found.  Referring practitioner: Chance Zhu MD    Subjective       Objective       Assessment/Plan    Manual Therapy:    ***     mins  38525;  Therapeutic Exercise:    ***     mins  56515;     Neuromuscular Yury:    ***    mins  96016;    Therapeutic Activity:     ***     mins  57232;     Gait Training:      ***     mins  42298;     Ultrasound:     ***     mins  52443;    Electrical Stimulation:    ***     mins  79304 ( );  Dry Needling     ***     mins self-pay    Timed Treatment:   ***   mins   Total Treatment:     ***   mins    PT SIGNATURE: Hao Lopez, PT   DATE TREATMENT INITIATED: 2022    {Certification Type:[id number]}  Certification Period: 2022  I certify that the therapy services are furnished while this patient is under my care.  The services outlined above are required by this patient, and will be reviewed every 90 days.     PHYSICIAN: Chance Zhu MD      DATE:     Please sign and return via fax to [phone].. Thank you, [facility].

## 2022-02-16 ENCOUNTER — TREATMENT (OUTPATIENT)
Dept: PHYSICAL THERAPY | Facility: CLINIC | Age: 32
End: 2022-02-16

## 2022-02-16 DIAGNOSIS — M79.89 PAIN AND SWELLING OF LEFT LOWER LEG: Primary | ICD-10-CM

## 2022-02-16 DIAGNOSIS — M25.662 STIFFNESS OF LEFT KNEE: ICD-10-CM

## 2022-02-16 DIAGNOSIS — M79.662 PAIN AND SWELLING OF LEFT LOWER LEG: Primary | ICD-10-CM

## 2022-02-16 DIAGNOSIS — T79.A22A TRAUMATIC COMPARTMENT SYNDROME OF LEFT LOWER EXTREMITY, INITIAL ENCOUNTER: ICD-10-CM

## 2022-02-16 PROCEDURE — 97112 NEUROMUSCULAR REEDUCATION: CPT | Performed by: PHYSICAL THERAPIST

## 2022-02-16 PROCEDURE — 97530 THERAPEUTIC ACTIVITIES: CPT | Performed by: PHYSICAL THERAPIST

## 2022-02-16 PROCEDURE — 97110 THERAPEUTIC EXERCISES: CPT | Performed by: PHYSICAL THERAPIST

## 2022-02-18 ENCOUNTER — TREATMENT (OUTPATIENT)
Dept: PHYSICAL THERAPY | Facility: CLINIC | Age: 32
End: 2022-02-18

## 2022-02-18 DIAGNOSIS — M79.89 PAIN AND SWELLING OF LEFT LOWER LEG: Primary | ICD-10-CM

## 2022-02-18 DIAGNOSIS — M79.662 PAIN AND SWELLING OF LEFT LOWER LEG: Primary | ICD-10-CM

## 2022-02-18 DIAGNOSIS — M25.662 STIFFNESS OF LEFT KNEE: ICD-10-CM

## 2022-02-18 PROCEDURE — 97110 THERAPEUTIC EXERCISES: CPT | Performed by: PHYSICAL THERAPIST

## 2022-02-18 PROCEDURE — 97112 NEUROMUSCULAR REEDUCATION: CPT | Performed by: PHYSICAL THERAPIST

## 2022-02-18 PROCEDURE — 97530 THERAPEUTIC ACTIVITIES: CPT | Performed by: PHYSICAL THERAPIST

## 2022-02-18 PROCEDURE — 97140 MANUAL THERAPY 1/> REGIONS: CPT | Performed by: PHYSICAL THERAPIST

## 2022-02-18 NOTE — PROGRESS NOTES
Physical Therapy Daily Progress Note    Patient: Jagdish Kennedy   : 1990  Diagnosis/ICD-10 Code:  Pain and swelling of left lower leg [M79.662, M79.89]  Referring practitioner: Chance Zhu MD  Date of Initial Visit: Type: THERAPY  Noted: 2022  Today's Date: 2022  Patient seen for 8 sessions         Jagdish Kennedy reports feeling better overall.  States that he has had more ability to walk with more weight over his left leg.  Also, has been going to the gym and using the bike a few times per week.      Subjective     Objective   See Exercise, Manual, and Modality Logs for complete treatment.       Assessment/Plan  Mr. Kennedy is s/p L posterior compartment fasciotomy on 22.  Focused visit on improving posterior lower leg soft tissue structure mobility and facilitating fluid movement->heart.  Combined with improving loading tolerance and dynamic control during SL activities. Doing very well overall.         Manual Therapy:    11     mins  14133;  Therapeutic Exercise:    14     mins  71945;     Neuromuscular Yury:    9    mins  00191;    Therapeutic Activity:     12     mins  77082;     Gait Training:           mins  06228;     Ultrasound:          mins  01789;    Electrical Stimulation:         mins  06315 ( );  Dry Needling         mins self-pay    Timed Treatment:   46   mins   Total Treatment:     60   mins    Hao Lopez PT  Physical Therapist

## 2022-02-21 NOTE — PROGRESS NOTES
Physical Therapy Daily Progress Note    Subjective   Jagdish Kennedy reports that he feels like he is making good progress with therapy and he denies any specific pain in the leg. He has some soreness, but overall feels much better.       Objective   See Exercise, Manual, and Modality Logs for complete treatment.       Assessment/Plan     Pt progressing well and he demonstrates good tolerance to stanidng activity and dynamic activity in the clinic. Will cont to progress as indicated.     Progress per Plan of Care and Progress strengthening /stabilization /functional activity           Manual Therapy:         mins  40284;  Therapeutic Exercise:    13     mins  27859;     Neuromuscular Yury:    15    mins  25524;    Therapeutic Activity:     28     mins  06534;     Gait Training:           mins  14034;     Ultrasound:          mins  98093;    Electrical Stimulation:         mins  16193 ( );  E-Stim Attended:         mins  32475  Iontophoresis          mins 36121   Traction          mins  18812  Fluidotherapy          mins  17191  Dry Needling          mins self-pay - No Charge  Paraffin          mins  19327    Timed Treatment:   56   mins   Total Treatment:     56   mins    Jimbo Stovall, PT, DPT, OCS, Cert. DN  Physical Therapist

## 2022-02-23 ENCOUNTER — TREATMENT (OUTPATIENT)
Dept: PHYSICAL THERAPY | Facility: CLINIC | Age: 32
End: 2022-02-23

## 2022-02-23 DIAGNOSIS — M25.662 STIFFNESS OF LEFT KNEE: ICD-10-CM

## 2022-02-23 DIAGNOSIS — M79.89 PAIN AND SWELLING OF LEFT LOWER LEG: Primary | ICD-10-CM

## 2022-02-23 DIAGNOSIS — M79.662 PAIN AND SWELLING OF LEFT LOWER LEG: Primary | ICD-10-CM

## 2022-02-23 PROCEDURE — 97112 NEUROMUSCULAR REEDUCATION: CPT | Performed by: PHYSICAL THERAPIST

## 2022-02-23 PROCEDURE — 97530 THERAPEUTIC ACTIVITIES: CPT | Performed by: PHYSICAL THERAPIST

## 2022-02-23 PROCEDURE — 97110 THERAPEUTIC EXERCISES: CPT | Performed by: PHYSICAL THERAPIST

## 2022-02-25 ENCOUNTER — TREATMENT (OUTPATIENT)
Dept: PHYSICAL THERAPY | Facility: CLINIC | Age: 32
End: 2022-02-25

## 2022-02-25 DIAGNOSIS — M79.89 PAIN AND SWELLING OF LEFT LOWER LEG: Primary | ICD-10-CM

## 2022-02-25 DIAGNOSIS — M79.662 PAIN AND SWELLING OF LEFT LOWER LEG: Primary | ICD-10-CM

## 2022-02-25 PROCEDURE — 97112 NEUROMUSCULAR REEDUCATION: CPT | Performed by: PHYSICAL THERAPIST

## 2022-02-25 PROCEDURE — 97530 THERAPEUTIC ACTIVITIES: CPT | Performed by: PHYSICAL THERAPIST

## 2022-02-25 PROCEDURE — 97110 THERAPEUTIC EXERCISES: CPT | Performed by: PHYSICAL THERAPIST

## 2022-02-25 PROCEDURE — 97140 MANUAL THERAPY 1/> REGIONS: CPT | Performed by: PHYSICAL THERAPIST

## 2022-02-25 NOTE — PROGRESS NOTES
Physical Therapy Daily Progress Note    Patient: Jagdish Kennedy   : 1990  Diagnosis/ICD-10 Code:  Pain and swelling of left lower leg [M79.662, M79.89]  Referring practitioner: Chance Zhu MD  Date of Initial Visit: Type: THERAPY  Noted: 2022  Today's Date: 2022  Patient seen for 9 sessions         Jagdish Kennedy reports that his leg is doing well.  His girlfriend accidentally lightly hit his calf with her foot, so is a little sore from that.  Still noticing swelling along the medial knee.      Subjective     Objective   See Exercise, Manual, and Modality Logs for complete treatment.       Assessment/Plan  Mr. Kennedy is s/p L posterior fasciotomy on 22.  Has full knee flx mobility.  Still has mild edema at the distal aspect of the posteromedial thigh region.  And, has stiffness along the superficial aspects of the surgical incision.  Focused visit on improving DF mobility of the ankle, general balance and L LE mm endurance.           Manual Therapy:    12     mins  42208;  Therapeutic Exercise:    14     mins  79121;     Neuromuscular Yury:    9    mins  70454;    Therapeutic Activity:     9     mins  66394;     Gait Training:           mins  03806;     Ultrasound:          mins  49032;    Electrical Stimulation:         mins  87503 ( );  Dry Needling          mins self-pay    Timed Treatment:   44   mins   Total Treatment:     54   mins    Hao Lopez PT  Physical Therapist

## 2022-02-28 ENCOUNTER — TELEPHONE (OUTPATIENT)
Dept: ORTHOPEDIC SURGERY | Facility: CLINIC | Age: 32
End: 2022-02-28

## 2022-02-28 ENCOUNTER — TREATMENT (OUTPATIENT)
Dept: PHYSICAL THERAPY | Facility: CLINIC | Age: 32
End: 2022-02-28

## 2022-02-28 ENCOUNTER — OFFICE VISIT (OUTPATIENT)
Dept: ORTHOPEDIC SURGERY | Facility: CLINIC | Age: 32
End: 2022-02-28

## 2022-02-28 DIAGNOSIS — U07.1 LAB TEST POSITIVE FOR DETECTION OF COVID-19 VIRUS: ICD-10-CM

## 2022-02-28 DIAGNOSIS — M79.89 PAIN AND SWELLING OF LEFT LOWER LEG: Primary | ICD-10-CM

## 2022-02-28 DIAGNOSIS — M79.662 PAIN AND SWELLING OF LEFT LOWER LEG: Primary | ICD-10-CM

## 2022-02-28 DIAGNOSIS — T79.A22S TRAUMATIC COMPARTMENT SYNDROME OF LEFT LOWER EXTREMITY, SEQUELA: Primary | ICD-10-CM

## 2022-02-28 PROCEDURE — 99024 POSTOP FOLLOW-UP VISIT: CPT | Performed by: ORTHOPAEDIC SURGERY

## 2022-02-28 PROCEDURE — 97140 MANUAL THERAPY 1/> REGIONS: CPT | Performed by: PHYSICAL THERAPIST

## 2022-02-28 PROCEDURE — 97112 NEUROMUSCULAR REEDUCATION: CPT | Performed by: PHYSICAL THERAPIST

## 2022-02-28 PROCEDURE — 97110 THERAPEUTIC EXERCISES: CPT | Performed by: PHYSICAL THERAPIST

## 2022-02-28 PROCEDURE — 97530 THERAPEUTIC ACTIVITIES: CPT | Performed by: PHYSICAL THERAPIST

## 2022-02-28 NOTE — TELEPHONE ENCOUNTER
Are we able to fax the below info to the worker's comp ? Fax confirmed: [phone]    Thanks!  Deandre

## 2022-02-28 NOTE — PROGRESS NOTES
Physical Therapy Daily Progress Note    Subjective   Jagdish Kennedy reports that he is feeling good and he has less pain in his knee and lower leg. He feels more confident in walking and standing for long periods of time.       Objective   See Exercise, Manual, and Modality Logs for complete treatment.       Assessment/Plan     Pt has progressed well and he demonsyrates an improvement in his strength and tolerance to activity in the clinic. Will cont to progress as indicated.     Progress per Plan of Care and Progress strengthening /stabilization /functional activity           Manual Therapy:        mins  63429;  Therapeutic Exercise:    14     mins  30693;     Neuromuscular Yury:    15    mins  30393;    Therapeutic Activity:     26     mins  77035;     Gait Training:           mins  21434;     Ultrasound:          mins  64004;    Electrical Stimulation:         mins  51713 ( );  E-Stim Attended:         mins  07878  Iontophoresis          mins 62522   Traction          mins  10367  Fluidotherapy          mins  00576  Dry Needling          mins self-pay - No Charge  Paraffin          mins  19915    Timed Treatment:   55   mins   Total Treatment:     55   mins    Jimbo Stovall, PT, DPT, OCS, Cert. DN  Physical Therapist

## 2022-02-28 NOTE — TELEPHONE ENCOUNTER
Provider: DR WILSON     Caller: TONY    Relationship to Patient: NURSE CASE   ARTEMIO LEON    Phone Number: [phone]    Reason for Call: TONY WAS UNABLE TO ATTEND APPT WITH PT TODAY 2.28.22 , SHE IS NEEDING THE PHYSICAL THERAPY ORDER AND THE WORK NOTE FOR [employer] FAXED TO HER AT F#[phone]. SHE IS ALSO REQUESTING A CALL BACK TO DISCUSS THE DICTATION, PLEASE ADVISE     When was the patient last seen: 2.28.22

## 2022-02-28 NOTE — PROGRESS NOTES
Physical Therapy Daily Progress Note    Patient: Jagdish Kennedy   : 1990  Diagnosis/ICD-10 Code:  No primary diagnosis found.  Referring practitioner: Chance Zhu MD  Date of Initial Visit: No linked episodes  Today's Date: 2022  Patient seen for Visit count could not be calculated. Make sure you are using a visit which is associated with an episode. sessions         Jagdish Kennedy reports that he is feeling better overall.  Still has some swelling along the medial and posterior aspect of the knee.  States that he has some difficulty going down stairs as he swings his left leg to advance to the next step.  Otherwise, feels like he can walk a mile without difficulty and is going to try that today.    Subjective     Objective   See Exercise, Manual, and Modality Logs for complete treatment.     *LEFS:  51/80  *Knee ext/flx:  0-135 deg  *Hip aBd/ext MMT:  4+5 B/L    Assessment/Plan  Mr. Kennedy has attended physical therapy for a total of 11 visits s/p L posterior fasciotomy on 22.  Has improved knee mobility and proximal hip strength.  Posteromedial knee edema continues to decrease.  Mr. Kennedy is progressing very well with all exercises and activities in the clinic to prepare him for return to full work-related tasks without limitations.         Manual Therapy:    9     mins  96564;  Therapeutic Exercise:    12     mins  46403;     Neuromuscular Yury:    11    mins  95245;    Therapeutic Activity:     10     mins  90086;     Gait Training:           mins  09648;     Ultrasound:          mins  07211;    Electrical Stimulation:         mins  00740 ( );  Dry Needling          mins self-pay    Timed Treatment:   42   mins   Total Treatment:     45   mins    Hao Lopez, ASCENCION  Physical Therapist

## 2022-02-28 NOTE — PROGRESS NOTES
Chief Complaint   Patient presents with   • Post-op     1 month follow up; 6 weeks s/p (L) tibia fasciotomy 1/15/22, wound closure (L) leg 1/18/2022           HPI  He is doing well.  He has some lifting problems and problems with stairs.      There were no vitals filed for this visit.      Physical Exam:  His fasciotomy incision has healed well.  No sign of infection.  No redness no warmth compartments are soft      ICD-10-CM ICD-9-CM   1. Traumatic compartment syndrome of left lower extremity, sequela  T79.A22S 908.6   2. Lab test positive for detection of COVID-19 virus  U07.1 079.89       Orders Placed This Encounter   Procedures   • Ambulatory Referral to Physical Therapy Evaluate and treat, Ortho   He is doing well.  He may return to work tomorrow March 1.  Restrictions are no lifting more than 40 pounds.  Follow-up in 1 month.  Anticipate full duty then.          Chance Zhu M.D., [facility]  Orthopedic Surgeon  Fellowship Trained Sports Medicine  [facility]  Orthopedics and Sports Medicine  [address] [place] [address], [address]  [place] [address]      EMR Dragon/Transcription disclaimer:  Much of this encounter note is an electronic transcription of spoken language to printed text. Electronic transcription of spoken language may permit erroneous, or at times, nonsensical words or phrases to be inadvertently transcribed. Although I have reviewed the note for such errors, some may still exist.

## 2022-02-28 NOTE — TELEPHONE ENCOUNTER
Spoke with Jacqueline who was just inquiring about today's visit and wanting to make sure Dr. Zhu didn't have any concerns for the pt at today's visit; I told her based on his note, it did not look like he had any concerns at this time. She understood and is requesting we send the PT order, today's office note, and work note for today.    Deandre

## 2022-03-04 ENCOUNTER — TREATMENT (OUTPATIENT)
Dept: PHYSICAL THERAPY | Facility: CLINIC | Age: 32
End: 2022-03-04

## 2022-03-04 DIAGNOSIS — M79.662 PAIN AND SWELLING OF LEFT LOWER LEG: Primary | ICD-10-CM

## 2022-03-04 DIAGNOSIS — M79.89 PAIN AND SWELLING OF LEFT LOWER LEG: Primary | ICD-10-CM

## 2022-03-04 PROCEDURE — 97140 MANUAL THERAPY 1/> REGIONS: CPT | Performed by: PHYSICAL THERAPIST

## 2022-03-04 PROCEDURE — 97110 THERAPEUTIC EXERCISES: CPT | Performed by: PHYSICAL THERAPIST

## 2022-03-04 PROCEDURE — 97530 THERAPEUTIC ACTIVITIES: CPT | Performed by: PHYSICAL THERAPIST

## 2022-03-04 NOTE — PROGRESS NOTES
Physical Therapy Daily Progress Note    Patient: Jagdish Kennedy   : 1990  Diagnosis/ICD-10 Code:  Pain and swelling of left lower leg [M79.662, M79.89]  Referring practitioner: Chance Zhu MD  Date of Initial Visit: Type: THERAPY  Noted: 2022  Today's Date: 3/4/2022  Patient seen for 12 sessions         Jagdish Kennedy reports that his leg is swollen from standing/walking at work over the past week.  Denies throbbing pain in the leg.    Subjective     Objective   See Exercise, Manual, and Modality Logs for complete treatment.       Assessment/Plan  Focused visit on facilitating fluid movement->heart via manipulative techniques.  Combined with improving posterior lower leg soft tissue mobility, loading tolerance and ankle mobility.             Manual Therapy:    9     mins  85969;  Therapeutic Exercise:    23     mins  89774;     Neuromuscular Yury:        mins  15582;    Therapeutic Activity:     9     mins  68022;     Gait Training:           mins  10784;     Ultrasound:          mins  62515;    Electrical Stimulation:         mins  29657 ( );  Dry Needling          mins self-pay    Timed Treatment:   41   mins   Total Treatment:     41   mins    Hao Lopez PT  Physical Therapist

## 2022-03-07 ENCOUNTER — TREATMENT (OUTPATIENT)
Dept: PHYSICAL THERAPY | Facility: CLINIC | Age: 32
End: 2022-03-07

## 2022-03-07 DIAGNOSIS — M79.662 PAIN AND SWELLING OF LEFT LOWER LEG: Primary | ICD-10-CM

## 2022-03-07 DIAGNOSIS — M79.89 PAIN AND SWELLING OF LEFT LOWER LEG: Primary | ICD-10-CM

## 2022-03-07 PROCEDURE — 97140 MANUAL THERAPY 1/> REGIONS: CPT | Performed by: PHYSICAL THERAPIST

## 2022-03-07 PROCEDURE — 97530 THERAPEUTIC ACTIVITIES: CPT | Performed by: PHYSICAL THERAPIST

## 2022-03-07 PROCEDURE — 97112 NEUROMUSCULAR REEDUCATION: CPT | Performed by: PHYSICAL THERAPIST

## 2022-03-07 PROCEDURE — 97110 THERAPEUTIC EXERCISES: CPT | Performed by: PHYSICAL THERAPIST

## 2022-03-07 NOTE — PROGRESS NOTES
Physical Therapy Daily Progress Note    Patient: Jagdish Kennedy   : 1990  Diagnosis/ICD-10 Code:  Pain and swelling of left lower leg [M79.662, M79.89]  Referring practitioner: Chance Zhu MD  Date of Initial Visit: Type: THERAPY  Noted: 2022  Today's Date: 3/7/2022  Patient seen for 13 sessions         Jagdish Kennedy reports that his ankle and lower leg were not sore after last visit.  Feels good today.    Subjective     Objective   See Exercise, Manual, and Modality Logs for complete treatment.       Assessment/Plan  Continued emphasis on facilitating fluid movement->heart.  Combined with exercises to improve posterior soft tissue tension loading tolerance and general strength and balance of the lower extremity.         Manual Therapy:    11     mins  79579;  Therapeutic Exercise:    19     mins  59376;     Neuromuscular Yury:    9    mins  75121;    Therapeutic Activity:     11     mins  33648;     Gait Training:           mins  31720;     Ultrasound:          mins  87781;    Electrical Stimulation:         mins  85912 ( );  Dry Needling         mins self-pay    Timed Treatment:   50   mins   Total Treatment:     53   mins    Hao Lopez PT  Physical Therapist

## 2022-03-11 ENCOUNTER — TREATMENT (OUTPATIENT)
Dept: PHYSICAL THERAPY | Facility: CLINIC | Age: 32
End: 2022-03-11

## 2022-03-11 DIAGNOSIS — M79.89 PAIN AND SWELLING OF LEFT LOWER LEG: Primary | ICD-10-CM

## 2022-03-11 DIAGNOSIS — M79.662 PAIN AND SWELLING OF LEFT LOWER LEG: Primary | ICD-10-CM

## 2022-03-11 PROCEDURE — 97110 THERAPEUTIC EXERCISES: CPT | Performed by: PHYSICAL THERAPIST

## 2022-03-11 PROCEDURE — 97140 MANUAL THERAPY 1/> REGIONS: CPT | Performed by: PHYSICAL THERAPIST

## 2022-03-11 PROCEDURE — 97530 THERAPEUTIC ACTIVITIES: CPT | Performed by: PHYSICAL THERAPIST

## 2022-03-11 PROCEDURE — 97112 NEUROMUSCULAR REEDUCATION: CPT | Performed by: PHYSICAL THERAPIST

## 2022-03-13 NOTE — PROGRESS NOTES
Physical Therapy Daily Progress Note    Patient: Jagdish Kennedy   : 1990  Diagnosis/ICD-10 Code:  Pain and swelling of left lower leg [M79.662, M79.89]  Referring practitioner: Chance Zhu MD  Date of Initial Visit: Type: THERAPY  Noted: 2022  Today's Date: 3/13/2022  Patient seen for 14 sessions         Jagdish Kennedy reports that his leg is more stiff and swollen today as he just got off of work.  Has been walking and standing quite a bit at work, but is allowed to rest as needed.      Subjective     Objective   See Exercise, Manual, and Modality Logs for complete treatment.       Assessment/Plan  Focused visit on reducing edema and stiffness in the lower leg.  Combined with improving general balance and loading through the L LE.           Manual Therapy:    12     mins  15235;  Therapeutic Exercise:    10     mins  33051;     Neuromuscular Yury:    9    mins  59033;    Therapeutic Activity:     10     mins  25880;     Gait Training:           mins  36512;     Ultrasound:          mins  85292;    Electrical Stimulation:         mins  41434 ( );  Dry Needling          mins self-pay    Timed Treatment:   41   mins   Total Treatment:     44   mins    Hao Lopez PT  Physical Therapist

## 2022-03-15 ENCOUNTER — TREATMENT (OUTPATIENT)
Dept: PHYSICAL THERAPY | Facility: CLINIC | Age: 32
End: 2022-03-15

## 2022-03-15 DIAGNOSIS — M79.662 PAIN AND SWELLING OF LEFT LOWER LEG: Primary | ICD-10-CM

## 2022-03-15 DIAGNOSIS — M79.89 PAIN AND SWELLING OF LEFT LOWER LEG: Primary | ICD-10-CM

## 2022-03-15 PROCEDURE — 97140 MANUAL THERAPY 1/> REGIONS: CPT | Performed by: PHYSICAL THERAPIST

## 2022-03-15 PROCEDURE — 97110 THERAPEUTIC EXERCISES: CPT | Performed by: PHYSICAL THERAPIST

## 2022-03-15 PROCEDURE — 97530 THERAPEUTIC ACTIVITIES: CPT | Performed by: PHYSICAL THERAPIST

## 2022-03-15 PROCEDURE — 97112 NEUROMUSCULAR REEDUCATION: CPT | Performed by: PHYSICAL THERAPIST

## 2022-03-15 NOTE — PROGRESS NOTES
Physical Therapy Daily Progress Note    Patient: Jagdish Kennedy   : 1990  Diagnosis/ICD-10 Code:  Pain and swelling of left lower leg [M79.662, M79.89]  Referring practitioner: Chance Zhu MD  Date of Initial Visit: No linked episodes  Today's Date: 3/15/2022  Patient seen for Visit count could not be calculated. Make sure you are using a visit which is associated with an episode. sessions         Jagdish Kennedy reports that his leg is the same since last visit.  Did some body weight squats without support with no pain in the lower leg.      Subjective     Objective   See Exercise, Manual, and Modality Logs for complete treatment.       Assessment/Plan  Continued emphasis on reducing posterior compartment stiffness of the lower leg.  Combined with improving DF mobility of the ankle and general loading tolerance/balance of the L LE joints.       Manual Therapy:    9     mins  73894;  Therapeutic Exercise:    16     mins  23629;     Neuromuscular Yury:    9    mins  16214;    Therapeutic Activity:     14     mins  15460;     Gait Training:           mins  46522;     Ultrasound:          mins  43648;    Electrical Stimulation:         mins  92827 ( );  Dry Needling          mins self-pay    Timed Treatment:   48   mins   Total Treatment:     50   mins    Hao Lopez, PT  Physical Therapist

## 2022-03-22 ENCOUNTER — TREATMENT (OUTPATIENT)
Dept: PHYSICAL THERAPY | Facility: CLINIC | Age: 32
End: 2022-03-22

## 2022-03-22 DIAGNOSIS — M79.662 PAIN AND SWELLING OF LEFT LOWER LEG: Primary | ICD-10-CM

## 2022-03-22 DIAGNOSIS — M79.89 PAIN AND SWELLING OF LEFT LOWER LEG: Primary | ICD-10-CM

## 2022-03-22 PROCEDURE — 97140 MANUAL THERAPY 1/> REGIONS: CPT | Performed by: PHYSICAL THERAPIST

## 2022-03-22 PROCEDURE — 97110 THERAPEUTIC EXERCISES: CPT | Performed by: PHYSICAL THERAPIST

## 2022-03-22 PROCEDURE — 97530 THERAPEUTIC ACTIVITIES: CPT | Performed by: PHYSICAL THERAPIST

## 2022-03-22 PROCEDURE — 97112 NEUROMUSCULAR REEDUCATION: CPT | Performed by: PHYSICAL THERAPIST

## 2022-03-22 NOTE — PROGRESS NOTES
Physical Therapy Daily Progress Note    Patient: Jagdish Kennedy   : 1990  Diagnosis/ICD-10 Code:  Pain and swelling of left lower leg [M79.662, M79.89]  Referring practitioner: Chance Zhu MD  Date of Initial Visit: Type: THERAPY  Noted: 2022  Today's Date: 3/22/2022  Patient seen for 16 sessions         Jagdish Kennedy reports that his lower leg is feeling better overall.  Has noticed less swelling after work today.  Is doing more leg strengthening in the gym without pain.  Follows up with the orthopaedic surgeon next Monday.    Subjective     Objective   See Exercise, Manual, and Modality Logs for complete treatment.       Assessment/Plan  Focused visit on improving soft tissue mobility of the posterior lower leg.  Combined with improving DF mobility, general balance and loading tolerance of the L LE.           Manual Therapy:    12     mins  38668;  Therapeutic Exercise:    14     mins  60462;     Neuromuscular Yury:    9    mins  80001;    Therapeutic Activity:     12     mins  07916;     Gait Training:           mins  95753;     Ultrasound:          mins  02734;    Electrical Stimulation:         mins  92839 ( );  Dry Needling          mins self-pay    Timed Treatment:   45   mins   Total Treatment:     48   mins    Hao Lopez PT  Physical Therapist

## 2022-03-28 ENCOUNTER — OFFICE VISIT (OUTPATIENT)
Dept: ORTHOPEDIC SURGERY | Facility: CLINIC | Age: 32
End: 2022-03-28

## 2022-03-28 VITALS
BODY MASS INDEX: 31.08 KG/M2 | SYSTOLIC BLOOD PRESSURE: 130 MMHG | DIASTOLIC BLOOD PRESSURE: 80 MMHG | WEIGHT: 250 LBS | HEIGHT: 75 IN

## 2022-03-28 DIAGNOSIS — T79.A22S TRAUMATIC COMPARTMENT SYNDROME OF LEFT LOWER EXTREMITY, SEQUELA: ICD-10-CM

## 2022-03-28 DIAGNOSIS — Z02.6 ENCOUNTER RELATED TO WORKER'S COMPENSATION CLAIM: Primary | ICD-10-CM

## 2022-03-28 PROCEDURE — 99024 POSTOP FOLLOW-UP VISIT: CPT | Performed by: ORTHOPAEDIC SURGERY

## 2022-03-28 NOTE — PROGRESS NOTES
"      [facility] Note    Subjective     CC: Follow-up (1 month follow up -- 10 weeks s/p (L) tibia fasciotomy 1/15/22, wound closure (L) leg 1/18/2022)      MURALI Kennedy is a 31 y.o. male.  He is doing well with no complaints.  He is here with his  today.  He believes he can return to work full duty.    Review of Systems   Musculoskeletal: Positive for arthralgias.   All other systems reviewed and are negative.      ROS:    Constiutional:Pt denies fever, chills, nausea, or vomiting.  MSK:as above      Objective      Past Medical History  History reviewed. No pertinent past medical history.      Physical Exam  /80   Ht 190.5 cm (75\")   Wt 113 kg (250 lb)   BMI 31.25 kg/m²     Body mass index is 31.25 kg/m².    Patient is well nourished and well developed.        Ortho Exam  His left leg looks good.  Incision completely healed.  He has full strength.  Full motion.  No deficit.    Imaging/Labs/EMG Reviewed:  Imaging Results (Last 24 Hours)     ** No results found for the last 24 hours. **          Assessment:  1. Encounter related to worker's compensation claim    2. Traumatic compartment syndrome of left lower extremity, sequela        Plan:  He is at [facility].  He may return to work full duty no restrictions.  He has no permanent impairment.  He will continue home exercise strengthening program.  Anti-inflammatories as needed.    Follow Up:   Return if symptoms worsen or fail to improve.      Medical Decision Making  Management Options : Low - OTC Drugs        Chance Zhu M.D., [facility]  Orthopedic Surgeon  Fellowship Trained Sports Medicine  [facility]  Orthopedics and Sports Medicine  [address] [place] [address], [address]  [place] [address]    EMR Dragon/Transcription disclaimer:  Much of this encounter note is an electronic transcription of spoken language to printed text. Electronic transcription of spoken language may permit erroneous, or at " times, nonsensical words or phrases to be inadvertently transcribed. Although I have reviewed the note for such errors, some may still exist.

## (undated) DEVICE — TRAP FLD MINIVAC MEGADYNE 100ML

## (undated) DEVICE — PREVENA PEEL & PLACE SYSTEM KIT- 13 CM: Brand: PREVENA™ PEEL & PLACE™

## (undated) DEVICE — SPNG GZ WOVN 4X4IN 12PLY 10/BX STRL

## (undated) DEVICE — GLV SURG PREMIERPRO MIC LTX PF SZ8.5 BRN

## (undated) DEVICE — PK EXTREM LOWR 10

## (undated) DEVICE — GLV SURG SENSICARE PI ORTHO SZ8.5 LF STRL

## (undated) DEVICE — PREVENA DUO PEEL & PLACE SYSTEM KIT- 13 CM: Brand: PREVENA DUO™ PEEL & PLACE™

## (undated) DEVICE — TBG PENCL TELESCP MEGADYNE SMOKE EVAC 10FT

## (undated) DEVICE — PATIENT RETURN ELECTRODE, SINGLE-USE, CONTACT QUALITY MONITORING, ADULT, WITH 9FT CORD, FOR PATIENTS WEIGING OVER 33LBS. (15KG): Brand: MEGADYNE

## (undated) DEVICE — SYS SKIN CLS DERMABOND PRINEO W/60CM MESH TP

## (undated) DEVICE — SYS PUMP PREVENA125 INCSN MNGT PP/DRSNG 45ML 1P/U

## (undated) DEVICE — ANTIBACTERIAL UNDYED BRAIDED (POLYGLACTIN 910), SYNTHETIC ABSORBABLE SUTURE: Brand: COATED VICRYL

## (undated) DEVICE — GOWN,REINFORCE,POLY,SIRUS,BREATH SLV,XLG: Brand: MEDLINE

## (undated) DEVICE — 3M™ STERI-DRAPE™ INSTRUMENT POUCH 1018: Brand: STERI-DRAPE™